# Patient Record
Sex: MALE | Race: WHITE
[De-identification: names, ages, dates, MRNs, and addresses within clinical notes are randomized per-mention and may not be internally consistent; named-entity substitution may affect disease eponyms.]

---

## 2016-12-20 VITALS
SYSTOLIC BLOOD PRESSURE: 161 MMHG | SYSTOLIC BLOOD PRESSURE: 161 MMHG | SYSTOLIC BLOOD PRESSURE: 161 MMHG | DIASTOLIC BLOOD PRESSURE: 88 MMHG | DIASTOLIC BLOOD PRESSURE: 88 MMHG | DIASTOLIC BLOOD PRESSURE: 88 MMHG

## 2020-01-07 ENCOUNTER — HOSPITAL ENCOUNTER (EMERGENCY)
Dept: HOSPITAL 61 - ER | Age: 65
Discharge: LEFT BEFORE DISCHARGE | End: 2020-01-07
Payer: COMMERCIAL

## 2020-01-07 DIAGNOSIS — T14.8XXA: Primary | ICD-10-CM
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
## 2020-01-07 DIAGNOSIS — Z53.21: ICD-10-CM
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
## 2020-02-10 ENCOUNTER — HOSPITAL ENCOUNTER (OUTPATIENT)
Dept: HOSPITAL 61 - SURGPAT | Age: 65
Discharge: HOME | End: 2020-02-10
Attending: ORTHOPAEDIC SURGERY
Payer: COMMERCIAL

## 2020-02-10 DIAGNOSIS — M17.12: ICD-10-CM

## 2020-02-10 DIAGNOSIS — Z88.8: ICD-10-CM

## 2020-02-10 DIAGNOSIS — Z01.812: Primary | ICD-10-CM

## 2020-02-10 LAB
ALBUMIN SERPL-MCNC: 3.8 G/DL (ref 3.4–5)
ANION GAP SERPL CALC-SCNC: 10 MMOL/L (ref 6–14)
APTT BLD: 30 SEC (ref 24–38)
BASOPHILS # BLD AUTO: 0 X10^3/UL (ref 0–0.2)
BASOPHILS NFR BLD: 1 % (ref 0–3)
BUN SERPL-MCNC: 15 MG/DL (ref 8–26)
CALCIUM SERPL-MCNC: 9.3 MG/DL (ref 8.5–10.1)
CHLORIDE SERPL-SCNC: 103 MMOL/L (ref 98–107)
CO2 SERPL-SCNC: 27 MMOL/L (ref 21–32)
CREAT SERPL-MCNC: 0.9 MG/DL (ref 0.7–1.3)
EOSINOPHIL NFR BLD: 0.2 X10^3/UL (ref 0–0.7)
EOSINOPHIL NFR BLD: 4 % (ref 0–3)
ERYTHROCYTE [DISTWIDTH] IN BLOOD BY AUTOMATED COUNT: 13.7 % (ref 11.5–14.5)
GFR SERPLBLD BASED ON 1.73 SQ M-ARVRAT: 85.2 ML/MIN
GLUCOSE SERPL-MCNC: 131 MG/DL (ref 70–99)
HCT VFR BLD CALC: 43.1 % (ref 39–53)
HGB BLD-MCNC: 14.4 G/DL (ref 13–17.5)
LYMPHOCYTES # BLD: 1.3 X10^3/UL (ref 1–4.8)
LYMPHOCYTES NFR BLD AUTO: 26 % (ref 24–48)
MCH RBC QN AUTO: 33 PG (ref 25–35)
MCHC RBC AUTO-ENTMCNC: 33 G/DL (ref 31–37)
MCV RBC AUTO: 98 FL (ref 79–100)
MONO #: 0.6 X10^3/UL (ref 0–1.1)
MONOCYTES NFR BLD: 12 % (ref 0–9)
NEUT #: 2.8 X10^3/UL (ref 1.8–7.7)
NEUTROPHILS NFR BLD AUTO: 57 % (ref 31–73)
PLATELET # BLD AUTO: 183 X10^3/UL (ref 140–400)
POTASSIUM SERPL-SCNC: 4.2 MMOL/L (ref 3.5–5.1)
PROTHROMBIN TIME: 12.6 SEC (ref 11.7–14)
RBC # BLD AUTO: 4.4 X10^6/UL (ref 4.3–5.7)
SODIUM SERPL-SCNC: 140 MMOL/L (ref 136–145)
WBC # BLD AUTO: 4.9 X10^3/UL (ref 4–11)

## 2020-02-10 PROCEDURE — 82040 ASSAY OF SERUM ALBUMIN: CPT

## 2020-02-10 PROCEDURE — 82306 VITAMIN D 25 HYDROXY: CPT

## 2020-02-10 PROCEDURE — 83036 HEMOGLOBIN GLYCOSYLATED A1C: CPT

## 2020-02-10 PROCEDURE — 85025 COMPLETE CBC W/AUTO DIFF WBC: CPT

## 2020-02-10 PROCEDURE — 36415 COLL VENOUS BLD VENIPUNCTURE: CPT

## 2020-02-10 PROCEDURE — 85651 RBC SED RATE NONAUTOMATED: CPT

## 2020-02-10 PROCEDURE — 85610 PROTHROMBIN TIME: CPT

## 2020-02-10 PROCEDURE — 85730 THROMBOPLASTIN TIME PARTIAL: CPT

## 2020-02-10 PROCEDURE — 80048 BASIC METABOLIC PNL TOTAL CA: CPT

## 2020-02-11 LAB — HBA1C MFR BLD: 6.2 % (ref 4.8–5.6)

## 2020-02-25 ENCOUNTER — HOSPITAL ENCOUNTER (OUTPATIENT)
Dept: HOSPITAL 61 - SURGPAT | Age: 65
Discharge: HOME | End: 2020-02-25
Attending: ORTHOPAEDIC SURGERY
Payer: COMMERCIAL

## 2020-02-25 DIAGNOSIS — J84.10: ICD-10-CM

## 2020-02-25 DIAGNOSIS — M17.12: ICD-10-CM

## 2020-02-25 DIAGNOSIS — Z01.818: Primary | ICD-10-CM

## 2020-02-25 PROCEDURE — 71046 X-RAY EXAM CHEST 2 VIEWS: CPT

## 2020-02-25 PROCEDURE — 87641 MR-STAPH DNA AMP PROBE: CPT

## 2020-02-25 PROCEDURE — 36415 COLL VENOUS BLD VENIPUNCTURE: CPT

## 2020-02-25 NOTE — RAD
EXAM: Chest, 2 views.

 

HISTORY: Pain.

 

COMPARISON: 12/13/2016

 

FINDINGS: 2 views of the chest are obtained. There is no infiltrate, 

pleural effusion or pneumothorax. The heart is normal in size. There is a 

stable granuloma overlying the left upper lobe.

 

IMPRESSION: No acute pulmonary finding.

 

Electronically signed by: Jayde Schultz MD (2/25/2020 4:32 PM) [id number]

## 2020-03-10 ENCOUNTER — HOSPITAL ENCOUNTER (OUTPATIENT)
Dept: HOSPITAL 61 - SURG | Age: 65
Setting detail: OBSERVATION
LOS: 1 days | Discharge: HOME | End: 2020-03-11
Attending: ORTHOPAEDIC SURGERY | Admitting: ORTHOPAEDIC SURGERY
Payer: COMMERCIAL

## 2020-03-10 VITALS — DIASTOLIC BLOOD PRESSURE: 48 MMHG | SYSTOLIC BLOOD PRESSURE: 140 MMHG

## 2020-03-10 VITALS — DIASTOLIC BLOOD PRESSURE: 69 MMHG | SYSTOLIC BLOOD PRESSURE: 129 MMHG

## 2020-03-10 VITALS — DIASTOLIC BLOOD PRESSURE: 69 MMHG | SYSTOLIC BLOOD PRESSURE: 131 MMHG

## 2020-03-10 VITALS — WEIGHT: 260 LBS | BODY MASS INDEX: 36.4 KG/M2 | HEIGHT: 71 IN

## 2020-03-10 VITALS — DIASTOLIC BLOOD PRESSURE: 52 MMHG | SYSTOLIC BLOOD PRESSURE: 117 MMHG

## 2020-03-10 VITALS — SYSTOLIC BLOOD PRESSURE: 139 MMHG | DIASTOLIC BLOOD PRESSURE: 69 MMHG

## 2020-03-10 VITALS — DIASTOLIC BLOOD PRESSURE: 43 MMHG | SYSTOLIC BLOOD PRESSURE: 141 MMHG

## 2020-03-10 VITALS — SYSTOLIC BLOOD PRESSURE: 101 MMHG | DIASTOLIC BLOOD PRESSURE: 65 MMHG

## 2020-03-10 VITALS — DIASTOLIC BLOOD PRESSURE: 67 MMHG | SYSTOLIC BLOOD PRESSURE: 127 MMHG

## 2020-03-10 DIAGNOSIS — F32.9: ICD-10-CM

## 2020-03-10 DIAGNOSIS — M96.840: Primary | ICD-10-CM

## 2020-03-10 DIAGNOSIS — I10: ICD-10-CM

## 2020-03-10 DIAGNOSIS — L03.116: ICD-10-CM

## 2020-03-10 DIAGNOSIS — E78.5: ICD-10-CM

## 2020-03-10 LAB
ANION GAP SERPL CALC-SCNC: 10 MMOL/L (ref 6–14)
BUN SERPL-MCNC: 16 MG/DL (ref 8–26)
CALCIUM SERPL-MCNC: 9 MG/DL (ref 8.5–10.1)
CHLORIDE SERPL-SCNC: 103 MMOL/L (ref 98–107)
CO2 SERPL-SCNC: 24 MMOL/L (ref 21–32)
CREAT SERPL-MCNC: 0.8 MG/DL (ref 0.7–1.3)
CRP SERPL-MCNC: 52 MG/L (ref 0–3.3)
GFR SERPLBLD BASED ON 1.73 SQ M-ARVRAT: 97.6 ML/MIN
GLUCOSE SERPL-MCNC: 114 MG/DL (ref 70–99)
POTASSIUM SERPL-SCNC: 4.1 MMOL/L (ref 3.5–5.1)
SODIUM SERPL-SCNC: 137 MMOL/L (ref 136–145)

## 2020-03-10 PROCEDURE — A4461 SURGICL DRESS HOLD NON-REUSE: HCPCS

## 2020-03-10 PROCEDURE — 96368 THER/DIAG CONCURRENT INF: CPT

## 2020-03-10 PROCEDURE — 87071 CULTURE AEROBIC QUANT OTHER: CPT

## 2020-03-10 PROCEDURE — C1769 GUIDE WIRE: HCPCS

## 2020-03-10 PROCEDURE — 96366 THER/PROPH/DIAG IV INF ADDON: CPT

## 2020-03-10 PROCEDURE — 80048 BASIC METABOLIC PNL TOTAL CA: CPT

## 2020-03-10 PROCEDURE — 87641 MR-STAPH DNA AMP PROBE: CPT

## 2020-03-10 PROCEDURE — 97165 OT EVAL LOW COMPLEX 30 MIN: CPT

## 2020-03-10 PROCEDURE — 87102 FUNGUS ISOLATION CULTURE: CPT

## 2020-03-10 PROCEDURE — 96365 THER/PROPH/DIAG IV INF INIT: CPT

## 2020-03-10 PROCEDURE — 97116 GAIT TRAINING THERAPY: CPT

## 2020-03-10 PROCEDURE — 82962 GLUCOSE BLOOD TEST: CPT

## 2020-03-10 PROCEDURE — 36415 COLL VENOUS BLD VENIPUNCTURE: CPT

## 2020-03-10 PROCEDURE — G0378 HOSPITAL OBSERVATION PER HR: HCPCS

## 2020-03-10 PROCEDURE — 86140 C-REACTIVE PROTEIN: CPT

## 2020-03-10 PROCEDURE — 96375 TX/PRO/DX INJ NEW DRUG ADDON: CPT

## 2020-03-10 PROCEDURE — 87075 CULTR BACTERIA EXCEPT BLOOD: CPT

## 2020-03-10 PROCEDURE — 85025 COMPLETE CBC W/AUTO DIFF WBC: CPT

## 2020-03-10 PROCEDURE — 85007 BL SMEAR W/DIFF WBC COUNT: CPT

## 2020-03-10 PROCEDURE — 96367 TX/PROPH/DG ADDL SEQ IV INF: CPT

## 2020-03-10 PROCEDURE — G0379 DIRECT REFER HOSPITAL OBSERV: HCPCS

## 2020-03-10 PROCEDURE — 97162 PT EVAL MOD COMPLEX 30 MIN: CPT

## 2020-03-10 PROCEDURE — 87116 MYCOBACTERIA CULTURE: CPT

## 2020-03-10 PROCEDURE — 85651 RBC SED RATE NONAUTOMATED: CPT

## 2020-03-10 PROCEDURE — 97110 THERAPEUTIC EXERCISES: CPT

## 2020-03-10 PROCEDURE — 90471 IMMUNIZATION ADMIN: CPT

## 2020-03-10 PROCEDURE — 27301 DRAIN THIGH/KNEE LESION: CPT

## 2020-03-10 PROCEDURE — 90686 IIV4 VACC NO PRSV 0.5 ML IM: CPT

## 2020-03-10 RX ADMIN — ONDANSETRON SCH MG: 2 INJECTION INTRAMUSCULAR; INTRAVENOUS at 18:00

## 2020-03-10 RX ADMIN — ONDANSETRON SCH MG: 4 TABLET, ORALLY DISINTEGRATING ORAL at 17:09

## 2020-03-10 RX ADMIN — OXYCODONE HYDROCHLORIDE AND ACETAMINOPHEN PRN TAB: 5; 325 TABLET ORAL at 21:12

## 2020-03-10 RX ADMIN — INSULIN LISPRO SCH UNITS: 100 INJECTION, SOLUTION INTRAVENOUS; SUBCUTANEOUS at 16:28

## 2020-03-10 RX ADMIN — OXYCODONE HYDROCHLORIDE AND ACETAMINOPHEN PRN TAB: 5; 325 TABLET ORAL at 17:09

## 2020-03-10 RX ADMIN — METOPROLOL TARTRATE SCH MG: 50 TABLET, FILM COATED ORAL at 20:37

## 2020-03-10 RX ADMIN — FENTANYL CITRATE PRN MCG: 50 INJECTION INTRAMUSCULAR; INTRAVENOUS at 15:39

## 2020-03-10 RX ADMIN — BACITRACIN SCH MLS/HR: 5000 INJECTION, POWDER, FOR SOLUTION INTRAMUSCULAR at 17:10

## 2020-03-10 RX ADMIN — FENTANYL CITRATE PRN MCG: 50 INJECTION INTRAMUSCULAR; INTRAVENOUS at 15:30

## 2020-03-10 NOTE — PDOC4
Operative Note


Operative Note


Date of Procedure: March 10, 2020


Pre-Op Diagnosis:  Left knee cellulitis and hematoma L03.116


Post-Op Diagnosis: Left knee cellulitis and hematoma L03.116


Procedure:  irrigation and debridement left knee deep hematoma CPT 92346


Surgeon: Mira Breen MD


Assistant: PAULA Jorge


Anesthesia:  General


EBL: 50 mL


Specimens Obtained:  


1.  superficial (bursa)  fluid for aerobic, anaerobic, fungal and AFB cultures, 

sent in a specimen cup, there is a possibility that this culture is contaminated

by skin mo because the fluid ran over the skin as it flowed into the specimen

cup


2.  deep (synovial) fluid for aerobic, anaerobic, fungal, and AFB cultures sent 

in a specimen cup


Complications:  none


Drains: none


Findings: deep heavily clotted/coagulated hematoma, and a superficial hemat

gil/bursal fluid collection which was more serous and not clotted.  No apparent 

infection.





Indications for Procedure: Mr. Cesar had left total knee arthroplasty 7 days 

ago.  At the time of knee replacement he had more than an average amount of the 

typical synovitis seen in osteoarthritic knees.  Much of that synovium was 

excised at the time of the surgery, using electrocautery.  He has been on 

aspirin for DVT prophylaxis.  He was more active than I would have liked over 

the weekend, and apparently had a plumbing leak at home that caused him to work 

up on a ladder cutting out part of the ceiling and doing a plumbing repair.  I 

saw him in the office yesterday, and he has serous fluid still draining from the

incision, has saturated several of his dressings, and has extensive ecchymosis 

of the leg and thigh.  I attempted aspiration in the office using a 16-gauge 

needle, but was unable to retrieve any joint fluid, presumably because there is 

a clotted hematoma.  I recommended incision and drainage of hematoma, and we 

will hold off on aggressive therapy, decrease his activity, and possibly stop 

the use of aspirin.  He agrees with surgical incision and drainage.  My index of

suspicion for any infection is very low, and I think we should give him 

preoperative antibiotics before the incision and drainage of the hematoma, but I

will take intraoperative cultures.  All of his questions about surgery were 

answered and he desires to proceed.





Procedure in Detail: The patient was identified in the preoperative holding are

a.  The correct left lower extremity was marked by me.  The patient was taken to

the operating room where general anesthetic was used.  The patient was 

positioned supine on the operating table. 


A tourniquet was applied to the upper portion of the limb.   The tourniquet was 

never inflated but was in preparation if needed.  Preoperative antibiotics were 

given intravenously, and due to a prior positive MRSA culture, he was given both

vancomycin and cephalexin.  A timeout procedure was performed.





The limb was prepared in sterile fashion with Betadine scrub and then Betadine 

paint. Sterile drapes were applied. An impervious stockinette was used over the 

lower limb and secured with a Coban. The operating team wore the personal 

exhaust ventilated hoods.





The tourniquet had been applied but I did not inflate it, so that I could assess

for areas of bleeding, and plans to use the tourniquet only if needed for 

excessive bleeding.  The previous incision was reopened sharply, and sutures 

were removed. The subcutaneous layer has copious amounts of bursal fluid, not 

clotted, and as this fluid ran over the skin I sent this for cultures.  There is

prominent bursal tissue (and he likely has mild chronic prepatellar bursitis.  I

had not excised the bursa previously but I did excise some of the bursa now.  

There was still quite a bit of subcutaneous tissue oozing, no major vessels, but

difficult to achieve hemostasis even with persistent electrocautery.  





The deep capsular sutures were now incised, and the deep clotted hematoma was 

encountered.  I used a rongeurs to grasp the clotted hematoma and sent this for 

the deep cultures.  There is no evidence of infection. There was a large amount 

of deep clotted hematoma, and persistent bleeding from the remaining synovium.  

Electrocautery was used, thoroughly, and despite this it was difficult to 

achieve hemostasis.





The Swengel interpulse  was used. I first used a liter of Bactisure 

irrigation solution, and continued to irrigate and flush the knee of residual 

hematoma, including the suprapatellar pouch, the medial and lateral gutters, and

the intercondylar notch. 





Next copious saline irrigation was used.  Betadine lavage was used next.  Final 

copious saline irrigation was used.  I used a 3 L bag of sterile saline and 

irrigated that throughout the joint and tissues.  There were small bleeders in 

the proximal capsular quadriceps region and  Bovie electrocautery was used for 

hemostasis.  Hemostasis was achieved, but was difficult due to persistent oozing

from almost all the surfaces of the tissue.  





I cleansed the skin again with a Betadine-soaked laparotomy sponge, and I 

debrided the unroofed and loose skin from the blisters using the laparotomy 

sponge.  The skin was then cleansed with a saline soaked laparotomy sponges and 

dried.  Outer gloves were changed. 





The incision was closed carefully in layers. #1 PDS was used in a 

figure-of-eight fashion throughout the medial capsular incision. #1 StrataFix 

was used in a running fashion and the medial capsule. #2-0 PDS was used in the s

ubcutaneous tissues using inverted interrupted pattern. The skin was 

reapproximated with staples. Xeroform was applied.  Needle and sponge counts 

were correct. There were no apparent complications.











MIRA BREEN MD                 Mar 10, 2020 14:17

## 2020-03-10 NOTE — PDOC
Provider Note


Provider Note


Due to the hematoma, and persistent bleeding at surgery, I am going to hold his 

aspirin.  For DVT prophylaxis we can use mechanical devices, and early 

mobilization.











MIRA CASTAÑEDA MD                 Mar 10, 2020 14:46

## 2020-03-10 NOTE — PDOC1
History and Physical


Date of Admission


Date of Admission


DATE: 3/10/20 


TIME: 14:18





Identification/Chief Complaint


Chief Complaint


Left knee hematoma





Source


Source:  Chart review, Patient





History of Present Illness


History of Present Illness


Mr. Cheng is a 64-year-old man who had left knee arthroplasty 7 days ago.  I 

saw him in the office yesterday with persistent bloody drainage.  I attempted 

aspiration but no fluid could be obtained.  He has extensive ecchymosis, some 

serous drainage from the skin, and probable hemarthrosis which is clotted on 

examination.  He is here for irrigation of the total knee arthroplasty hematoma.





Past Medical History


Cardiovascular:  HTN, Hyperlipidemia


Pulmonary:  Other


Psych:  Depression





Past Surgical History


Past Surgical History:  Other





Family History


Family History:  No Significant





Social History


ALCOHOL:  heavy


Drugs:  None





Current Medications


Current Medications





Current Medications


Fentanyl Citrate (Fentanyl 2ml Vial) 25 mcg PRN Q5MIN  PRN IV MILD PAIN 1-3;  

Start 3/10/20 at 07:00;  Stop 3/11/20 at 06:59


Fentanyl Citrate (Fentanyl 2ml Vial) 50 mcg PRN Q5MIN  PRN IV MODERATE TO SEVERE

PAIN;  Start 3/10/20 at 07:00;  Stop 3/11/20 at 06:59


Morphine Sulfate (Morphine Sulfate) 1 mg PRN Q10MIN  PRN IV SEVERE PAIN 7-10;  

Start 3/10/20 at 07:00;  Stop 3/11/20 at 06:59


Ringer's Solution 1,000 ml @  30 mls/hr Q24H IV ;  Start 3/10/20 at 07:00;  Stop

3/10/20 at 18:59


Lidocaine HCl (Xylocaine-Mpf 1% 2ml Vial) 2 ml PRN 1X  PRN ID PRIOR TO IV START;

 Start 3/10/20 at 07:00;  Stop 3/11/20 at 06:59


Hydromorphone HCl (Dilaudid) 0.5 mg PRN Q10MIN  PRN IV SEV PAIN, Second choice; 

Start 3/10/20 at 07:00;  Stop 3/11/20 at 06:59


Prochlorperazine Edisylate (Compazine) 5 mg PACU PRN  PRN IV NAUSEA, MRX1;  

Start 3/10/20 at 07:00;  Stop 3/11/20 at 06:59


Acetaminophen (Tylenol) 1,000 mg 1X PREOP  PRN PO PRIOR TO PROCEDURE Last 

administered on 3/10/20at 12:29;  Start 3/10/20 at 06:00;  Stop 3/10/20 at 18:00


Cefazolin Sodium 3 gm/Dextrose 100 ml @  200 mls/hr 1X PREOP  PRN IV PRIOR TO 

PROCEDURE;  Start 3/10/20 at 06:00;  Stop 3/10/20 at 18:00


Vancomycin HCl 250 ml @  250 mls/hr 1X PREOP  PRN IV PRIOR TO PROCEDURE Last 

administered on 3/10/20at 12:12;  Start 3/10/20 at 06:00;  Stop 3/10/20 at 18:00


Tranexamic Acid 1000 mg/Sodium Chloride 60 ml @ 60 mls/hr 1X PERIOP  ONCE INJ ; 

Start 3/10/20 at 06:00;  Stop 3/10/20 at 06:59;  Status DC


Tranexamic Acid 1000 mg/Sodium Chloride 60 ml @ 60 mls/hr 1X PERIOP  ONCE INJ ; 

Start 3/10/20 at 08:00;  Stop 3/10/20 at 08:59;  Status DC


Celecoxib (CeleBREX) 400 mg ONCE  ONCE PO  Last administered on 3/10/20at 12:29;

 Start 3/10/20 at 06:00;  Stop 3/10/20 at 06:01;  Status DC


Vancomycin HCl (Vancomycin) 1 gm STK-MED ONCE .ROUTE  Last administered on 

3/10/20at 13:25;  Start 3/10/20 at 11:54;  Stop 3/10/20 at 11:54;  Status DC


Tobramycin Sulfate (Tobramycin Powder) 1.2 gm STK-MED ONCE .ROUTE ;  Start 

3/10/20 at 11:54;  Stop 3/10/20 at 11:54;  Status DC


Vancomycin HCl (Vancomycin) 1 gm STK-MED ONCE .ROUTE ;  Start 3/10/20 at 11:54; 

Stop 3/10/20 at 11:54;  Status DC


Morphine Sulfate 5 mg/Ketorolac Tromethamine 30 mg/Ropivacaine 60 ml/Epinephrine

HCl 0.5 mg/Sodium Chloride 100 ml @  100 mls/hr 1X  ONCE INT ART ;  Start 

3/10/20 at 12:15;  Stop 3/10/20 at 13:14;  Status DC


Celecoxib (CeleBREX) 100 mg STK-MED ONCE .ROUTE ;  Start 3/10/20 at 12:24;  Stop

3/10/20 at 12:24;  Status DC


Insulin Human Lispro (HumaLOG VIAL for OP,RR ONLY) 0-10 units PRN Q1HR  PRN SQ 

PER PROTOCOL Last administered on 3/10/20at 12:38;  Start 3/10/20 at 12:30;  

Stop 3/11/20 at 12:29


Propofol 20 ml @ As Directed STK-MED ONCE IV ;  Start 3/10/20 at 12:34;  Stop 

3/10/20 at 12:34;  Status DC


Dexamethasone Sodium Phosphate (Decadron) 4 mg STK-MED ONCE .ROUTE ;  Start 

3/10/20 at 12:34;  Stop 3/10/20 at 12:34;  Status DC


Famotidine (Pepcid Vial) 20 mg STK-MED ONCE .ROUTE ;  Start 3/10/20 at 12:34;  

Stop 3/10/20 at 12:34;  Status DC


Lidocaine HCl (Lidocaine Pf 2% Vial) 5 ml STK-MED ONCE .ROUTE ;  Start 3/10/20 

at 12:34;  Stop 3/10/20 at 12:34;  Status DC


Ondansetron HCl (Zofran) 4 mg STK-MED ONCE .ROUTE ;  Start 3/10/20 at 12:34;  

Stop 3/10/20 at 12:34;  Status DC


Fentanyl Citrate (Fentanyl 2ml Vial) 100 mcg STK-MED ONCE .ROUTE ;  Start 

3/10/20 at 12:34;  Stop 3/10/20 at 12:34;  Status DC


Midazolam HCl (Versed) 2 mg STK-MED ONCE .ROUTE ;  Start 3/10/20 at 12:34;  Stop

3/10/20 at 12:34;  Status DC


Ephedrine Sulfate (ePHEDrine PF IN SALINE SYRINGE) 50 mg STK-MED ONCE IV ;  

Start 3/10/20 at 12:55;  Stop 3/10/20 at 12:55;  Status DC


Sevoflurane (Ultane) 60 ml STK-MED ONCE IH ;  Start 3/10/20 at 14:04;  Stop 

3/10/20 at 14:05;  Status DC





Active Scripts


Active


Ondansetron Odt (Ondansetron) 4 Mg Tab.rapdis 4 Mg PO PRN Q6HRS PRN 8 Days


     Take one tablet by mouth every 6 hrs prn nausea.


Percocet 5-325 Mg Tablet ** (Oxycodone/Acetaminophen) 1 Each Tablet 2 Tab PO PRN

Q4HRS PRN MDD 8 tablets daily 14 Days


Reported


Amlodipine Besylate 10 Mg Tablet 10 Mg PO DAILY


Atorvastatin Calcium 40 Mg Tablet 1 Tab PO DAILY


Metformin HCl 500 Mg/5 Ml Solution 500 Mg PO BIDAC


Metoprolol Tartrate 50 Mg Tablet 1 Tab PO BID


Escitalopram Oxalate 20 Mg Tablet 1 Tab PO DAILY


Zetia (Ezetimibe) 10 Mg Tablet 1 Tab PO DAILY 30 Days


Lisinopril 40 Mg Tablet 1 Tab PO DAILY


Celebrex (Celecoxib) 200 Mg Capsule 1 Cap PO DAILY





Allergies


Allergies:  


Coded Allergies:  


     atorvastatin (Verified  Allergy, Intermediate, 3/10/20)


     venlafaxine (Verified  Allergy, Intermediate, Unknown, 3/10/20)


     I S O L A T I O N *CONTACT* (Verified  Allergy, Unknown, 3/10/20)


   


   mrsa


     bupropion (Verified  Adverse Reaction, Intermediate, 3/10/20)


   


   CAUSED INCREASED PAIN


     sertraline (Verified  Adverse Reaction, Intermediate, Unknown, 3/10/20)





Physical Exam


General:  Alert


HEENT:  Atraumatic


Lungs:  Normal air movement


Heart:  RRR


Abdomen:  Soft


Extremities:  Other (There is serous drainage from the incision.  There is 

extensive ecchymosis of the leg and some cellulitis.  There is no purulent 

drainage, no warmth, no systemic fevers, no obvious infection.  There is a 

hemarthrosis on examination.)


Skin:  Other (He had still has some persistent psoriatic lesions.  There are 

some blisters which have previously ruptured around the knee.)


Neuro:  Normal speech, Sensation intact





Vitals


Vitals





Vital Signs








  Date Time  Temp Pulse Resp B/P (MAP) Pulse Ox O2 Delivery O2 Flow Rate FiO2


 


3/10/20 11:57 97.4 57 20 151/69 95 Room Air  





 97.4       











Labs


Labs





Laboratory Tests








Test


 3/10/20


12:00 3/10/20


12:15


 


Erythrocyte Sedimentation Rate 60 (0-15)  


 


Sodium Level


 137 mmol/L


(136-145) 





 


Potassium Level


 4.1 mmol/L


(3.5-5.1) 





 


Chloride Level


 103 mmol/L


() 





 


Carbon Dioxide Level


 24 mmol/L


(21-32) 





 


Anion Gap 10 (6-14)  


 


Blood Urea Nitrogen


 16 mg/dL


(8-26) 





 


Creatinine


 0.8 mg/dL


(0.7-1.3) 





 


Estimated GFR


(Cockcroft-Gault) 97.6 


 





 


Glucose Level


 114 mg/dL


(70-99) 





 


Calcium Level


 9.0 mg/dL


(8.5-10.1) 





 


C-Reactive Protein,


Quantitative 52.0 mg/L


(0-3.3) 





 


Glucose (Fingerstick)


 


 119 mg/dL


(70-99)








Laboratory Tests








Test


 3/10/20


12:00 3/10/20


12:15


 


Erythrocyte Sedimentation Rate 60 (0-15)  


 


Sodium Level


 137 mmol/L


(136-145) 





 


Potassium Level


 4.1 mmol/L


(3.5-5.1) 





 


Chloride Level


 103 mmol/L


() 





 


Carbon Dioxide Level


 24 mmol/L


(21-32) 





 


Anion Gap 10 (6-14)  


 


Blood Urea Nitrogen


 16 mg/dL


(8-26) 





 


Creatinine


 0.8 mg/dL


(0.7-1.3) 





 


Estimated GFR


(Cockcroft-Gault) 97.6 


 





 


Glucose Level


 114 mg/dL


(70-99) 





 


Calcium Level


 9.0 mg/dL


(8.5-10.1) 





 


C-Reactive Protein,


Quantitative 52.0 mg/L


(0-3.3) 





 


Glucose (Fingerstick)


 


 119 mg/dL


(70-99)











VTE Prophylaxis Ordered


VTE Prophylaxis Devices:  Yes


VTE Pharmacological Prophylaxi:  Contraindicated





Assessment/Plan


Assessment/Plan


He is here for irrigation and debridement of the left knee hemarthrosis and 

hematoma.











MIRA CASTAÑEDA MD                 Mar 10, 2020 14:20

## 2020-03-11 VITALS — SYSTOLIC BLOOD PRESSURE: 127 MMHG | DIASTOLIC BLOOD PRESSURE: 63 MMHG

## 2020-03-11 VITALS
DIASTOLIC BLOOD PRESSURE: 55 MMHG | DIASTOLIC BLOOD PRESSURE: 55 MMHG | SYSTOLIC BLOOD PRESSURE: 111 MMHG | SYSTOLIC BLOOD PRESSURE: 111 MMHG | SYSTOLIC BLOOD PRESSURE: 111 MMHG | DIASTOLIC BLOOD PRESSURE: 55 MMHG | SYSTOLIC BLOOD PRESSURE: 111 MMHG | DIASTOLIC BLOOD PRESSURE: 55 MMHG

## 2020-03-11 VITALS — SYSTOLIC BLOOD PRESSURE: 119 MMHG | DIASTOLIC BLOOD PRESSURE: 64 MMHG

## 2020-03-11 VITALS — SYSTOLIC BLOOD PRESSURE: 105 MMHG | DIASTOLIC BLOOD PRESSURE: 54 MMHG

## 2020-03-11 LAB
% BANDS: 2 % (ref 0–9)
% LYMPHS: 9 % (ref 24–48)
% MONOS: 2 % (ref 0–10)
% SEGS: 87 % (ref 35–66)
ANISOCYTOSIS BLD QL SMEAR: SLIGHT
BASOPHILS # BLD AUTO: 0 X10^3/UL (ref 0–0.2)
BASOPHILS NFR BLD: 0 % (ref 0–3)
EOSINOPHIL NFR BLD: 0 % (ref 0–3)
EOSINOPHIL NFR BLD: 0 X10^3/UL (ref 0–0.7)
ERYTHROCYTE [DISTWIDTH] IN BLOOD BY AUTOMATED COUNT: 13.4 % (ref 11.5–14.5)
HCT VFR BLD CALC: 30.2 % (ref 39–53)
HGB BLD-MCNC: 10.4 G/DL (ref 13–17.5)
LYMPHOCYTES # BLD: 0.5 X10^3/UL (ref 1–4.8)
LYMPHOCYTES NFR BLD AUTO: 8 % (ref 24–48)
MCH RBC QN AUTO: 34 PG (ref 25–35)
MCHC RBC AUTO-ENTMCNC: 34 G/DL (ref 31–37)
MCV RBC AUTO: 98 FL (ref 79–100)
MONO #: 0.4 X10^3/UL (ref 0–1.1)
MONOCYTES NFR BLD: 5 % (ref 0–9)
NEUT #: 5.9 X10^3/UL (ref 1.8–7.7)
NEUTROPHILS NFR BLD AUTO: 87 % (ref 31–73)
PLATELET # BLD AUTO: 232 X10^3/UL (ref 140–400)
PLATELET # BLD EST: ADEQUATE 10*3/UL
POLYCHROMASIA BLD QL SMEAR: SLIGHT
RBC # BLD AUTO: 3.07 X10^6/UL (ref 4.3–5.7)
WBC # BLD AUTO: 6.8 X10^3/UL (ref 4–11)

## 2020-03-11 RX ADMIN — OXYCODONE HYDROCHLORIDE AND ACETAMINOPHEN PRN TAB: 5; 325 TABLET ORAL at 09:58

## 2020-03-11 RX ADMIN — ONDANSETRON SCH MG: 4 TABLET, ORALLY DISINTEGRATING ORAL at 00:00

## 2020-03-11 RX ADMIN — ONDANSETRON SCH MG: 2 INJECTION INTRAMUSCULAR; INTRAVENOUS at 00:00

## 2020-03-11 RX ADMIN — ONDANSETRON SCH MG: 4 TABLET, ORALLY DISINTEGRATING ORAL at 06:00

## 2020-03-11 RX ADMIN — INSULIN LISPRO SCH UNITS: 100 INJECTION, SOLUTION INTRAVENOUS; SUBCUTANEOUS at 11:51

## 2020-03-11 RX ADMIN — ONDANSETRON SCH MG: 2 INJECTION INTRAMUSCULAR; INTRAVENOUS at 06:00

## 2020-03-11 RX ADMIN — OXYCODONE HYDROCHLORIDE AND ACETAMINOPHEN PRN TAB: 5; 325 TABLET ORAL at 14:20

## 2020-03-11 RX ADMIN — INSULIN LISPRO SCH UNITS: 100 INJECTION, SOLUTION INTRAVENOUS; SUBCUTANEOUS at 07:46

## 2020-03-11 RX ADMIN — ONDANSETRON SCH MG: 4 TABLET, ORALLY DISINTEGRATING ORAL at 11:51

## 2020-03-11 RX ADMIN — BACITRACIN SCH MLS/HR: 5000 INJECTION, POWDER, FOR SOLUTION INTRAMUSCULAR at 14:40

## 2020-03-11 RX ADMIN — ONDANSETRON SCH MG: 2 INJECTION INTRAMUSCULAR; INTRAVENOUS at 11:51

## 2020-03-11 RX ADMIN — OXYCODONE HYDROCHLORIDE AND ACETAMINOPHEN PRN TAB: 5; 325 TABLET ORAL at 06:05

## 2020-03-11 RX ADMIN — METOPROLOL TARTRATE SCH MG: 50 TABLET, FILM COATED ORAL at 08:13

## 2020-03-11 RX ADMIN — OXYCODONE HYDROCHLORIDE AND ACETAMINOPHEN PRN TAB: 5; 325 TABLET ORAL at 01:46

## 2020-03-11 NOTE — NUR
SW following. Discussed with RN, pt from home with wife. Currently on IV abx. RN advised no 
SW needs at this time. ALIREZA will continue to follow. 

-------------------------------------------------------------------------------

Addendum: 03/11/20 at 1534 by NIKUNJ LAY

-------------------------------------------------------------------------------

OT recommending home with outpatient therapy. Pt will need a script for outpatient therapy 
to take to the outpatient therapy office of his choosing. RN awaiting Dr. Breen to round. ALIREZA 
will continue to follow.

## 2020-03-11 NOTE — PDOC
PROGRESS NOTES


Subjective


Subjective


Doing better today





Objective


Vital Signs





Vital Signs








  Date Time  Temp Pulse Resp B/P (MAP) Pulse Ox O2 Delivery O2 Flow Rate FiO2


 


3/11/20 15:59      Room Air  


 


3/11/20 15:00 98.4 60 18 111/55 (73) 95   





 98.4       


 


3/10/20 17:09       2.0 








Physical Exam


Incision dry now, dressing changed earlier today. Knee looks and feels better.


Labs


Micro doesn't even have preliminary results yet.  Two specimens sent yesterday. 

I will follow these as outpatient.








Laboratory Tests








Test


 3/10/20


12:00 3/10/20


12:15 3/10/20


14:37 3/10/20


16:19


 


Erythrocyte Sedimentation Rate 60 (0-15)    


 


Sodium Level


 137 mmol/L


(136-145) 


 


 





 


Potassium Level


 4.1 mmol/L


(3.5-5.1) 


 


 





 


Chloride Level


 103 mmol/L


() 


 


 





 


Carbon Dioxide Level


 24 mmol/L


(21-32) 


 


 





 


Anion Gap 10 (6-14)    


 


Blood Urea Nitrogen


 16 mg/dL


(8-26) 


 


 





 


Creatinine


 0.8 mg/dL


(0.7-1.3) 


 


 





 


Estimated GFR


(Cockcroft-Gault) 97.6 


 


 


 





 


Glucose Level


 114 mg/dL


(70-99) 


 


 





 


Calcium Level


 9.0 mg/dL


(8.5-10.1) 


 


 





 


C-Reactive Protein,


Quantitative 52.0 mg/L


(0-3.3) 


 


 





 


Glucose (Fingerstick)


 


 119 mg/dL


(70-99) 127 mg/dL


(70-99) 126 mg/dL


(70-99)


 


Test


 3/10/20


20:42 3/11/20


03:35 3/11/20


07:32 3/11/20


11:44


 


Glucose (Fingerstick)


 194 mg/dL


(70-99) 


 139 mg/dL


(70-99) 148 mg/dL


(70-99)


 


White Blood Count


 


 6.8 x10^3/uL


(4.0-11.0) 


 





 


Red Blood Count


 


 3.07 x10^6/uL


(4.30-5.70) 


 





 


Hemoglobin


 


 10.4 g/dL


(13.0-17.5) 


 





 


Hematocrit


 


 30.2 %


(39.0-53.0) 


 





 


Mean Corpuscular Volume  98 fL ()   


 


Mean Corpuscular Hemoglobin  34 pg (25-35)   


 


Mean Corpuscular Hemoglobin


Concent 


 34 g/dL


(31-37) 


 





 


Red Cell Distribution Width


 


 13.4 %


(11.5-14.5) 


 





 


Platelet Count


 


 232 x10^3/uL


(140-400) 


 





 


Neutrophils (%) (Auto)  87 % (31-73)   


 


Lymphocytes (%) (Auto)  8 % (24-48)   


 


Monocytes (%) (Auto)  5 % (0-9)   


 


Eosinophils (%) (Auto)  0 % (0-3)   


 


Basophils (%) (Auto)  0 % (0-3)   


 


Neutrophils # (Auto)


 


 5.9 x10^3/uL


(1.8-7.7) 


 





 


Lymphocytes # (Auto)


 


 0.5 x10^3/uL


(1.0-4.8) 


 





 


Monocytes # (Auto)


 


 0.4 x10^3/uL


(0.0-1.1) 


 





 


Eosinophils # (Auto)


 


 0.0 x10^3/uL


(0.0-0.7) 


 





 


Basophils # (Auto)


 


 0.0 x10^3/uL


(0.0-0.2) 


 





 


Segmented Neutrophils %  87 % (35-66)   


 


Band Neutrophils %  2 % (0-9)   


 


Lymphocytes %  9 % (24-48)   


 


Monocytes %  2 % (0-10)   


 


Platelet Estimate


 


 Adequate


(ADEQUATE) 


 





 


Polychromasia  Slight   


 


Anisocytosis  Slight   








Laboratory Tests








Test


 3/10/20


20:42 3/11/20


03:35 3/11/20


07:32 3/11/20


11:44


 


Glucose (Fingerstick)


 194 mg/dL


(70-99) 


 139 mg/dL


(70-99) 148 mg/dL


(70-99)


 


White Blood Count


 


 6.8 x10^3/uL


(4.0-11.0) 


 





 


Red Blood Count


 


 3.07 x10^6/uL


(4.30-5.70) 


 





 


Hemoglobin


 


 10.4 g/dL


(13.0-17.5) 


 





 


Hematocrit


 


 30.2 %


(39.0-53.0) 


 





 


Mean Corpuscular Volume  98 fL ()   


 


Mean Corpuscular Hemoglobin  34 pg (25-35)   


 


Mean Corpuscular Hemoglobin


Concent 


 34 g/dL


(31-37) 


 





 


Red Cell Distribution Width


 


 13.4 %


(11.5-14.5) 


 





 


Platelet Count


 


 232 x10^3/uL


(140-400) 


 





 


Neutrophils (%) (Auto)  87 % (31-73)   


 


Lymphocytes (%) (Auto)  8 % (24-48)   


 


Monocytes (%) (Auto)  5 % (0-9)   


 


Eosinophils (%) (Auto)  0 % (0-3)   


 


Basophils (%) (Auto)  0 % (0-3)   


 


Neutrophils # (Auto)


 


 5.9 x10^3/uL


(1.8-7.7) 


 





 


Lymphocytes # (Auto)


 


 0.5 x10^3/uL


(1.0-4.8) 


 





 


Monocytes # (Auto)


 


 0.4 x10^3/uL


(0.0-1.1) 


 





 


Eosinophils # (Auto)


 


 0.0 x10^3/uL


(0.0-0.7) 


 





 


Basophils # (Auto)


 


 0.0 x10^3/uL


(0.0-0.2) 


 





 


Segmented Neutrophils %  87 % (35-66)   


 


Band Neutrophils %  2 % (0-9)   


 


Lymphocytes %  9 % (24-48)   


 


Monocytes %  2 % (0-10)   


 


Platelet Estimate


 


 Adequate


(ADEQUATE) 


 





 


Polychromasia  Slight   


 


Anisocytosis  Slight   











Assessment


Assessment


POD#1 knee hematoma I&D





Plan


Plan of Care


Home today. Office FU 3/23











MIRA CASTAÑEDA MD                 Mar 11, 2020 16:57

## 2020-03-11 NOTE — NUR
Pt. discharged to home verbalized understanding of discharge instructions. L knee dressing 
CDI. Keflex sent to pharmacy per Dr. santana.

## 2020-03-11 NOTE — PDOC
ORTHO PROGRESS NOTES


Subjective


Patient is feeling well today, pain controlled, swelling down in the knee.


Post-op Day:  1


Procedure


 irrigation and debridement left knee deep hematoma CPT 02320


Vitals





Vital Signs








  Date Time  Temp Pulse Resp B/P (MAP) Pulse Ox O2 Delivery O2 Flow Rate FiO2


 


3/11/20 11:09      Room Air  


 


3/11/20 11:00 98.7 65 18 119/64 (82) 93   





 98.7       


 


3/10/20 17:09       2.0 








Labs





Laboratory Tests








Test


 3/10/20


12:00 3/10/20


12:15 3/10/20


14:37 3/10/20


16:19


 


Erythrocyte Sedimentation Rate 60 (0-15)    


 


Sodium Level


 137 mmol/L


(136-145) 


 


 





 


Potassium Level


 4.1 mmol/L


(3.5-5.1) 


 


 





 


Chloride Level


 103 mmol/L


() 


 


 





 


Carbon Dioxide Level


 24 mmol/L


(21-32) 


 


 





 


Anion Gap 10 (6-14)    


 


Blood Urea Nitrogen


 16 mg/dL


(8-26) 


 


 





 


Creatinine


 0.8 mg/dL


(0.7-1.3) 


 


 





 


Estimated GFR


(Cockcroft-Gault) 97.6 


 


 


 





 


Glucose Level


 114 mg/dL


(70-99) 


 


 





 


Calcium Level


 9.0 mg/dL


(8.5-10.1) 


 


 





 


C-Reactive Protein,


Quantitative 52.0 mg/L


(0-3.3) 


 


 





 


Glucose (Fingerstick)


 


 119 mg/dL


(70-99) 127 mg/dL


(70-99) 126 mg/dL


(70-99)


 


Test


 3/10/20


20:42 3/11/20


03:35 3/11/20


07:32 3/11/20


11:44


 


Glucose (Fingerstick)


 194 mg/dL


(70-99) 


 139 mg/dL


(70-99) 148 mg/dL


(70-99)


 


White Blood Count


 


 6.8 x10^3/uL


(4.0-11.0) 


 





 


Red Blood Count


 


 3.07 x10^6/uL


(4.30-5.70) 


 





 


Hemoglobin


 


 10.4 g/dL


(13.0-17.5) 


 





 


Hematocrit


 


 30.2 %


(39.0-53.0) 


 





 


Mean Corpuscular Volume  98 fL ()   


 


Mean Corpuscular Hemoglobin  34 pg (25-35)   


 


Mean Corpuscular Hemoglobin


Concent 


 34 g/dL


(31-37) 


 





 


Red Cell Distribution Width


 


 13.4 %


(11.5-14.5) 


 





 


Platelet Count


 


 232 x10^3/uL


(140-400) 


 





 


Neutrophils (%) (Auto)  87 % (31-73)   


 


Lymphocytes (%) (Auto)  8 % (24-48)   


 


Monocytes (%) (Auto)  5 % (0-9)   


 


Eosinophils (%) (Auto)  0 % (0-3)   


 


Basophils (%) (Auto)  0 % (0-3)   


 


Neutrophils # (Auto)


 


 5.9 x10^3/uL


(1.8-7.7) 


 





 


Lymphocytes # (Auto)


 


 0.5 x10^3/uL


(1.0-4.8) 


 





 


Monocytes # (Auto)


 


 0.4 x10^3/uL


(0.0-1.1) 


 





 


Eosinophils # (Auto)


 


 0.0 x10^3/uL


(0.0-0.7) 


 





 


Basophils # (Auto)


 


 0.0 x10^3/uL


(0.0-0.2) 


 





 


Segmented Neutrophils %  87 % (35-66)   


 


Band Neutrophils %  2 % (0-9)   


 


Lymphocytes %  9 % (24-48)   


 


Monocytes %  2 % (0-10)   


 


Platelet Estimate


 


 Adequate


(ADEQUATE) 


 





 


Polychromasia  Slight   


 


Anisocytosis  Slight   








Laboratory Tests








Test


 3/10/20


14:37 3/10/20


16:19 3/10/20


20:42 3/11/20


03:35


 


Glucose (Fingerstick)


 127 mg/dL


(70-99) 126 mg/dL


(70-99) 194 mg/dL


(70-99) 





 


White Blood Count


 


 


 


 6.8 x10^3/uL


(4.0-11.0)


 


Red Blood Count


 


 


 


 3.07 x10^6/uL


(4.30-5.70)


 


Hemoglobin


 


 


 


 10.4 g/dL


(13.0-17.5)


 


Hematocrit


 


 


 


 30.2 %


(39.0-53.0)


 


Mean Corpuscular Volume    98 fL () 


 


Mean Corpuscular Hemoglobin    34 pg (25-35) 


 


Mean Corpuscular Hemoglobin


Concent 


 


 


 34 g/dL


(31-37)


 


Red Cell Distribution Width


 


 


 


 13.4 %


(11.5-14.5)


 


Platelet Count


 


 


 


 232 x10^3/uL


(140-400)


 


Neutrophils (%) (Auto)    87 % (31-73) 


 


Lymphocytes (%) (Auto)    8 % (24-48) 


 


Monocytes (%) (Auto)    5 % (0-9) 


 


Eosinophils (%) (Auto)    0 % (0-3) 


 


Basophils (%) (Auto)    0 % (0-3) 


 


Neutrophils # (Auto)


 


 


 


 5.9 x10^3/uL


(1.8-7.7)


 


Lymphocytes # (Auto)


 


 


 


 0.5 x10^3/uL


(1.0-4.8)


 


Monocytes # (Auto)


 


 


 


 0.4 x10^3/uL


(0.0-1.1)


 


Eosinophils # (Auto)


 


 


 


 0.0 x10^3/uL


(0.0-0.7)


 


Basophils # (Auto)


 


 


 


 0.0 x10^3/uL


(0.0-0.2)


 


Segmented Neutrophils %    87 % (35-66) 


 


Band Neutrophils %    2 % (0-9) 


 


Lymphocytes %    9 % (24-48) 


 


Monocytes %    2 % (0-10) 


 


Platelet Estimate


 


 


 


 Adequate


(ADEQUATE)


 


Polychromasia    Slight 


 


Anisocytosis    Slight 


 


Test


 3/11/20


07:32 3/11/20


11:44 


 





 


Glucose (Fingerstick)


 139 mg/dL


(70-99) 148 mg/dL


(70-99) 


 











Notes


Pt A&Ox3, eating breakfast. LEFT knee swelling down compared to last Friday when

I last saw him. Bandage partially saturated so after obtaining new dressing, 

removed and cleansed with chlorhexadine, 4x4 reapplied followed by ABD pads, 6" 

ACE wrap. Calf nontender bilterally, neg dimas's sign bilaterally. thigh 

nontender. No distress, Neurovascularly intact. DP pulses 2+. Labs: cultures 

negative with no growth. VS normal.


Problems:  


(1) Aftercare following left knee joint replacement surgery


Assessment and Plan


Monitor dressing and if drainage persists, then dressing change should occur. 


Dressing change daily. 


ICS,


No medicaiton changes.


PT/OT to maintain ROM, strength outpatient when discharged to home. 


Anticipate discharge today.











JOANNE [facility]INS [facility]       Mar 11, 2020 12:48 pm

## 2020-03-11 NOTE — DISCH
DISCHARGE INSTRUCTIONS


Condition on Discharge


Condition on Discharge:  Stable





Activity After Discharge


Activity Instructions for Disc:  Activity as tolerated, Progressive ambulation


Bathing Instructions:  Shower-keep dressing dry


Lifting Instructions after Dis:  No heavy lifting, Do not lift >10 pounds


Exercise Instruction after Dis:  Exercise per therapy, Progress as tolerated


Driving Instructions after Dis:  No driving for 2 weeks


Weight Bearing Status after Di:  As tolerated





Diet after Discharge


Diet after Discharge:  Regular


Diet Texture:  Regular


Liquid Texture:  Thin Liquid


Swallowing Supervision:  None needed





Wound Incision Care


Wound/Incision Care:  Ice to area for comfort, Keep wound/cast CDI, Do not 

change dressing





Checks after Discharge


Checks after discharge:  Check blood press - daily, Check blood sugar, ac/hs





Contacting the DR. after DC


Call your doctor for:  If your condition worsens





Follow-Up


Follow up with:  [facility]. Call [phone]











JOANNE GOINS [facility]        Mar 11, 2020 16:28

## 2020-08-19 ENCOUNTER — HOSPITAL ENCOUNTER (OUTPATIENT)
Dept: HOSPITAL 61 - KCIC | Age: 65
Discharge: HOME | End: 2020-08-19
Payer: COMMERCIAL

## 2020-08-19 DIAGNOSIS — Z79.899: ICD-10-CM

## 2020-08-19 DIAGNOSIS — J98.4: Primary | ICD-10-CM

## 2020-08-19 PROCEDURE — 71046 X-RAY EXAM CHEST 2 VIEWS: CPT

## 2020-08-20 NOTE — KCIC
EXAM: CHEST 2 VIEWS.

 

HISTORY: At risk medical therapy.

 

COMPARISON: 02/25/2020.

 

FINDINGS: Frontal and lateral views of the chest are obtained.

 

There are no confluent infiltrates. No interstitial changes are 

appreciated. There is a calcified granuloma in the left upper lobe. There 

is no pneumothorax or pleural effusion. The heart is not enlarged.

 

IMPRESSION:

1. No confluent infiltrates. 

 

Electronically signed by: MARILOU Azevedo MD (8/20/2020 8:51 AM) 

[id number]

## 2020-11-23 ENCOUNTER — HOSPITAL ENCOUNTER (OUTPATIENT)
Dept: HOSPITAL 61 - SURGPAT | Age: 65
End: 2020-11-23
Attending: ORTHOPAEDIC SURGERY
Payer: COMMERCIAL

## 2020-11-23 DIAGNOSIS — M17.11: ICD-10-CM

## 2020-11-23 DIAGNOSIS — Z01.812: Primary | ICD-10-CM

## 2020-11-23 DIAGNOSIS — Z96.611: ICD-10-CM

## 2020-11-23 LAB
ALBUMIN SERPL-MCNC: 3.4 G/DL (ref 3.4–5)
ANION GAP SERPL CALC-SCNC: 11 MMOL/L (ref 6–14)
APTT BLD: 31 SEC (ref 24–38)
BASOPHILS # BLD AUTO: 0.1 X10^3/UL (ref 0–0.2)
BASOPHILS NFR BLD: 1 % (ref 0–3)
BUN SERPL-MCNC: 18 MG/DL (ref 8–26)
CALCIUM SERPL-MCNC: 8.9 MG/DL (ref 8.5–10.1)
CHLORIDE SERPL-SCNC: 105 MMOL/L (ref 98–107)
CO2 SERPL-SCNC: 23 MMOL/L (ref 21–32)
CREAT SERPL-MCNC: 1.2 MG/DL (ref 0.7–1.3)
CRP SERPL-MCNC: 0.7 MG/L (ref 0–3.3)
EOSINOPHIL NFR BLD: 0.3 X10^3/UL (ref 0–0.7)
EOSINOPHIL NFR BLD: 4 % (ref 0–3)
ERYTHROCYTE [DISTWIDTH] IN BLOOD BY AUTOMATED COUNT: 13.6 % (ref 11.5–14.5)
GFR SERPLBLD BASED ON 1.73 SQ M-ARVRAT: 61 ML/MIN
GLUCOSE SERPL-MCNC: 153 MG/DL (ref 70–99)
HCT VFR BLD CALC: 35 % (ref 39–53)
HGB BLD-MCNC: 11.9 G/DL (ref 13–17.5)
LYMPHOCYTES # BLD: 1.7 X10^3/UL (ref 1–4.8)
LYMPHOCYTES NFR BLD AUTO: 20 % (ref 24–48)
MCH RBC QN AUTO: 33 PG (ref 25–35)
MCHC RBC AUTO-ENTMCNC: 34 G/DL (ref 31–37)
MCV RBC AUTO: 96 FL (ref 79–100)
MONO #: 0.5 X10^3/UL (ref 0–1.1)
MONOCYTES NFR BLD: 6 % (ref 0–9)
NEUT #: 5.7 X10^3/UL (ref 1.8–7.7)
NEUTROPHILS NFR BLD AUTO: 69 % (ref 31–73)
PLATELET # BLD AUTO: 260 X10^3/UL (ref 140–400)
POTASSIUM SERPL-SCNC: 4.4 MMOL/L (ref 3.5–5.1)
PROTHROMBIN TIME: 13.3 SEC (ref 11.7–14)
RBC # BLD AUTO: 3.63 X10^6/UL (ref 4.3–5.7)
SODIUM SERPL-SCNC: 139 MMOL/L (ref 136–145)
WBC # BLD AUTO: 8.2 X10^3/UL (ref 4–11)

## 2020-11-23 PROCEDURE — 86140 C-REACTIVE PROTEIN: CPT

## 2020-11-23 PROCEDURE — 83036 HEMOGLOBIN GLYCOSYLATED A1C: CPT

## 2020-11-23 PROCEDURE — 82040 ASSAY OF SERUM ALBUMIN: CPT

## 2020-11-23 PROCEDURE — 85610 PROTHROMBIN TIME: CPT

## 2020-11-23 PROCEDURE — 85025 COMPLETE CBC W/AUTO DIFF WBC: CPT

## 2020-11-23 PROCEDURE — 82306 VITAMIN D 25 HYDROXY: CPT

## 2020-11-23 PROCEDURE — 87641 MR-STAPH DNA AMP PROBE: CPT

## 2020-11-23 PROCEDURE — 85730 THROMBOPLASTIN TIME PARTIAL: CPT

## 2020-11-23 PROCEDURE — 80048 BASIC METABOLIC PNL TOTAL CA: CPT

## 2020-11-23 PROCEDURE — 36415 COLL VENOUS BLD VENIPUNCTURE: CPT

## 2020-11-24 LAB — HBA1C MFR BLD: 5.8 % (ref 4.8–5.6)

## 2020-12-10 ENCOUNTER — HOSPITAL ENCOUNTER (OUTPATIENT)
Dept: HOSPITAL 61 - LAB | Age: 65
End: 2020-12-10
Attending: ORTHOPAEDIC SURGERY
Payer: COMMERCIAL

## 2020-12-10 DIAGNOSIS — Z20.828: ICD-10-CM

## 2020-12-10 DIAGNOSIS — Z01.812: Primary | ICD-10-CM

## 2020-12-10 PROCEDURE — U0003 INFECTIOUS AGENT DETECTION BY NUCLEIC ACID (DNA OR RNA); SEVERE ACUTE RESPIRATORY SYNDROME CORONAVIRUS 2 (SARS-COV-2) (CORONAVIRUS DISEASE [COVID-19]), AMPLIFIED PROBE TECHNIQUE, MAKING USE OF HIGH THROUGHPUT TECHNOLOGIES AS DESCRIBED BY CMS-2020-01-R: HCPCS

## 2020-12-13 NOTE — PDOC1
History and Physical


Date of Admission


Date of Admission


2020





Identification/Chief Complaint


Chief Complaint


right knee osteoarthritis pain





Source


Source:  Chart review, Patient





History of Present Illness


History of Present Illness


65 year old with osteoarthritis of his right knee.  Walking is painful. Pain 

interferes with ADLs, he has daily symptoms, and affects his quality of life. 

Had left TKA in 2020 which is doing well now, although he had a washout 

about 1 week postop due to hemarthrosis. Here for elective R TKA





Past Medical History


Past Medical History


psoriasis


tubular adenoma


psoriatic arthritis


HTN


sleep apnea


hyperlipidemia


Cardiovascular:  HTN, Hyperlipidemia


Pulmonary:  Other


Psych:  Depression


Musculoskeletal:  Osteoarthritis


Dermatology:  Psoriasis





Past Surgical History


Past Surgical History:  Total knee replacement, Other





Family History


Family History


parents are both 


Family History:  No Significant





Social History


Smoke:  Quit


ALCOHOL:  heavy


Drugs:  None





Current Medications


Current Medications





Current Medications


Ondansetron HCl (Zofran) 4 mg PRN Q6HRS  PRN IV NAUSEA/VOMITING;  Start 20

at 07:00;  Stop 12/15/20 at 06:59


Fentanyl Citrate (Fentanyl 2ml Vial) 25 mcg PRN Q5MIN  PRN IV MILD PAIN 1-3;  

Start 20 at 07:00;  Stop 12/15/20 at 06:59


Fentanyl Citrate (Fentanyl 2ml Vial) 50 mcg PRN Q5MIN  PRN IV MODERATE TO SEVERE

PAIN;  Start 20 at 07:00;  Stop 12/15/20 at 06:59


Morphine Sulfate (Morphine Sulfate) 1 mg PRN Q10MIN  PRN IV SEVERE PAIN 7-10;  

Start 20 at 07:00;  Stop 12/15/20 at 06:59


Ringer's Solution 1,000 ml @  30 mls/hr Q24H IV ;  Start 20 at 07:00;  

Stop 20 at 18:59


Lidocaine HCl (Xylocaine-Mpf 1% 2ml Vial) 2 ml PRN 1X  PRN ID PRIOR TO IV START;

 Start 20 at 07:00;  Stop 12/15/20 at 06:59


Hydromorphone HCl (Dilaudid) 0.5 mg PRN Q10MIN  PRN IV SEV PAIN, Second choice; 

Start 20 at 07:00;  Stop 12/15/20 at 06:59


Prochlorperazine Edisylate (Compazine) 5 mg PACU PRN  PRN IV NAUSEA, MRX1;  

Start 20 at 07:00;  Stop 12/15/20 at 06:59


Aspirin (Claudette Aspirin) 325 mg BID PO ;  Start 20 at 21:00


Celecoxib (CeleBREX) 400 mg 1X PREOP  PRN PO PRIOR TO SURGERY;  Start 20 

at 08:00


Acetaminophen (Tylenol) 1,000 mg 1X PREOP  PRN PO PRIOR TO PROCEDURE;  Start 

20 at 06:00;  Stop 20 at 18:00


Cefazolin Sodium/ Dextrose 50 ml @  100 mls/hr 1X PREOP  PRN IV PRIOR TO 

PROCEDURE;  Start 20 at 06:00;  Stop 20 at 18:00


Vancomycin HCl 250 ml @  250 mls/hr 1X PREOP  PRN IV PRIOR TO PROCEDURE;  Start 

20 at 06:00;  Stop 20 at 18:00


Morphine Sulfate 5 mg/Ketorolac Tromethamine 30 mg/Ropivacaine 60 ml/Epinephrine

HCl 0.5 mg/Sodium Chloride 100 ml @  100 mls/hr 1X PERIOP  ONCE INT ART ;  Start

20 at 06:00;  Stop 20 at 06:59


Tranexamic Acid 1000 mg/Sodium Chloride 60 ml @ 60 mls/hr 1X PERIOP  ONCE INJ ; 

Start 20 at 06:00;  Stop 20 at 06:59


Tranexamic Acid 1000 mg/Sodium Chloride 60 ml @ 60 mls/hr 1X PERIOP  ONCE INJ ; 

Start 20 at 08:00;  Stop 20 at 08:59





Active Scripts


Active


Reported


Omeprazole 20 Mg Tablet.dr 1 Tab PO DAILY


Aspirin 81 Mg Tab.chew 1 Tab PO DAILY


Lisinopril 10 Mg Tablet 1 Tab PO DAILY


Amlodipine Besylate 5 Mg Tablet 5 Mg PO DAILY


Metoprolol Succinate ( Xl ) (Metoprolol Succinate) 25 Mg Tab.er.24h 1 Tab PO 

DAILY


Atorvastatin Calcium 40 Mg Tablet 1 Tab PO DAILY


Metformin HCl 500 Mg/5 Ml Solution 500 Mg PO BIDAC


Zetia (Ezetimibe) 10 Mg Tablet 1 Tab PO DAILY 30 Days


Celebrex (Celecoxib) 200 Mg Capsule 1 Cap PO DAILY





Allergies


Allergies:  


Coded Allergies:  


     risankizumab-rzaa (Verified  Allergy, Severe, RASH, 20)


     atorvastatin (Verified  Allergy, Intermediate, 20)


     venlafaxine (Verified  Allergy, Intermediate, Unknown, 20)


     I S O L A T I O N *CONTACT* (Verified  Allergy, Unknown, 20)


   mrsa


     bupropion (Verified  Adverse Reaction, Intermediate, 20)


   CAUSED INCREASED PAIN


     sertraline (Verified  Adverse Reaction, Intermediate, Unknown, 20)





Physical Exam


General:  Alert, Cooperative


HEENT:  Atraumatic


Lungs:  Normal air movement


Heart:  RRR


Abdomen:  Soft


Extremities:  Other (The RIGHT knee shows a [mildly antalgic] gait.  There is 

[varus] alignment.  No masses.  [No detectable] effusion.  Tenderness on the 

joint lines.  Range of motion is [5-115] degrees.  There is crepitus with range 

of motion, and pain at the extremes of motion.  The knee is stable to varus and 

valgus stress without subluxation or laxity.  Muscle strength is slightly weak 

for the quadriceps 4+/5 which may be due to pain or avoidance, and does not seem

neurogenic, and the muscle tone and bulk is slightly decreased. The hamstring 

strength is 5/5.   The skin is normal with no scars, rashes, lesions or ulcers. 

Light touch sensation is intact. No edema and no varicosities.  Dorsalis pedis 

pulse is intact and capillary refill is normal.)


Neuro:  Normal speech, Sensation intact





Images


Images


PATIENT: JAMEE RAMÍREZ JACCOUNT: [id number]     MRN#: [id number]


: 1955           LOCATION: [facility]        AGE: 65


SEX: M                    EXAM DT: 20         ACCESSION#: [id number]


STATUS: PRE CLI           ORD. PHYSICIAN: MIRA CASTAÑEDA MD


REASON: 


PROCEDURE: KNEE RIGHT 2V





EXAM: AP view both knees, lateral and tangential patellar view right knee


 


DATE: 2020 12:00 AM


 


INDICATION: Reason: RIGHT KNEE PAIN / Spl. Instructions:  / History:  


 


COMPARISON: 3/3/2020


 


FINDINGS:


Severe right knee joint osteophytes arthritis with medial compartment 


joint space effacement and tricompartmental osteophytes. Neutral patellar 


tracking. Small right knee joint effusion. Vascular calcifications are 


seen.


 


Single AP view left knee demonstrates changes of left total knee 


arthroplasty, incompletely assessed. Subtle periprosthetic lucency at the 


medial aspect of the medial tibial plateau may be projectional and can be 


further assessed by dedicated left knee radiographs if clinically 


indicated.


 


IMPRESSION:


1.  Severe right knee joint osteoarthritis.


2.  No evidence of acute fracture or dislocation.


3.  Equivocal periprosthetic lucency subjacent to the medial tibial 


component, can be assessed by dedicated left knee radiographs if 


clinically indicated.


 


Electronically signed by: Armand Flower MD (2020 4:53 PM) [id number]














DICTATED and SIGNED BY:     ARMAND FLOWER MD


DATE:     20 1653





VTE Prophylaxis Ordered


VTE Prophylaxis Devices:  Yes


VTE Pharmacological Prophylaxi:  Yes





Assessment/Plan


Assessment/Plan


We discussed RBA of total knee arthroplasty. He tried nonop treatment without 

success. He is here today for elective TKA.





Justifications for Admission


Other Justification














MIRA CASTAÑEDA MD                 Dec 13, 2020 18:39

## 2020-12-14 ENCOUNTER — HOSPITAL ENCOUNTER (OUTPATIENT)
Dept: HOSPITAL 61 - SURG | Age: 65
Setting detail: OBSERVATION
LOS: 2 days | Discharge: HOME HEALTH | End: 2020-12-16
Attending: ORTHOPAEDIC SURGERY | Admitting: ORTHOPAEDIC SURGERY
Payer: COMMERCIAL

## 2020-12-14 VITALS — DIASTOLIC BLOOD PRESSURE: 75 MMHG | SYSTOLIC BLOOD PRESSURE: 143 MMHG

## 2020-12-14 VITALS — DIASTOLIC BLOOD PRESSURE: 76 MMHG | SYSTOLIC BLOOD PRESSURE: 137 MMHG

## 2020-12-14 VITALS — WEIGHT: 252 LBS | BODY MASS INDEX: 35.28 KG/M2 | HEIGHT: 71 IN

## 2020-12-14 VITALS — SYSTOLIC BLOOD PRESSURE: 136 MMHG | DIASTOLIC BLOOD PRESSURE: 74 MMHG

## 2020-12-14 VITALS — DIASTOLIC BLOOD PRESSURE: 71 MMHG | SYSTOLIC BLOOD PRESSURE: 145 MMHG

## 2020-12-14 VITALS — SYSTOLIC BLOOD PRESSURE: 135 MMHG | DIASTOLIC BLOOD PRESSURE: 75 MMHG

## 2020-12-14 DIAGNOSIS — L40.50: ICD-10-CM

## 2020-12-14 DIAGNOSIS — G47.30: ICD-10-CM

## 2020-12-14 DIAGNOSIS — F32.9: ICD-10-CM

## 2020-12-14 DIAGNOSIS — M17.11: Primary | ICD-10-CM

## 2020-12-14 DIAGNOSIS — M65.9: ICD-10-CM

## 2020-12-14 DIAGNOSIS — I10: ICD-10-CM

## 2020-12-14 DIAGNOSIS — L40.9: ICD-10-CM

## 2020-12-14 DIAGNOSIS — Z79.82: ICD-10-CM

## 2020-12-14 DIAGNOSIS — E78.5: ICD-10-CM

## 2020-12-14 DIAGNOSIS — Z87.891: ICD-10-CM

## 2020-12-14 DIAGNOSIS — Z96.652: ICD-10-CM

## 2020-12-14 DIAGNOSIS — Z79.01: ICD-10-CM

## 2020-12-14 PROCEDURE — 88311 DECALCIFY TISSUE: CPT

## 2020-12-14 PROCEDURE — 96365 THER/PROPH/DIAG IV INF INIT: CPT

## 2020-12-14 PROCEDURE — 85610 PROTHROMBIN TIME: CPT

## 2020-12-14 PROCEDURE — 97165 OT EVAL LOW COMPLEX 30 MIN: CPT

## 2020-12-14 PROCEDURE — 85014 HEMATOCRIT: CPT

## 2020-12-14 PROCEDURE — 96375 TX/PRO/DX INJ NEW DRUG ADDON: CPT

## 2020-12-14 PROCEDURE — 86850 RBC ANTIBODY SCREEN: CPT

## 2020-12-14 PROCEDURE — 86901 BLOOD TYPING SEROLOGIC RH(D): CPT

## 2020-12-14 PROCEDURE — 86900 BLOOD TYPING SEROLOGIC ABO: CPT

## 2020-12-14 PROCEDURE — 85018 HEMOGLOBIN: CPT

## 2020-12-14 PROCEDURE — 36415 COLL VENOUS BLD VENIPUNCTURE: CPT

## 2020-12-14 PROCEDURE — 97535 SELF CARE MNGMENT TRAINING: CPT

## 2020-12-14 PROCEDURE — 96366 THER/PROPH/DIAG IV INF ADDON: CPT

## 2020-12-14 PROCEDURE — G0378 HOSPITAL OBSERVATION PER HR: HCPCS

## 2020-12-14 PROCEDURE — 97150 GROUP THERAPEUTIC PROCEDURES: CPT

## 2020-12-14 PROCEDURE — 96376 TX/PRO/DX INJ SAME DRUG ADON: CPT

## 2020-12-14 PROCEDURE — G0379 DIRECT REFER HOSPITAL OBSERV: HCPCS

## 2020-12-14 PROCEDURE — A4461 SURGICL DRESS HOLD NON-REUSE: HCPCS

## 2020-12-14 PROCEDURE — 88305 TISSUE EXAM BY PATHOLOGIST: CPT

## 2020-12-14 PROCEDURE — 96361 HYDRATE IV INFUSION ADD-ON: CPT

## 2020-12-14 PROCEDURE — 27447 TOTAL KNEE ARTHROPLASTY: CPT

## 2020-12-14 PROCEDURE — 96368 THER/DIAG CONCURRENT INF: CPT

## 2020-12-14 PROCEDURE — 97116 GAIT TRAINING THERAPY: CPT

## 2020-12-14 PROCEDURE — C1769 GUIDE WIRE: HCPCS

## 2020-12-14 PROCEDURE — 73560 X-RAY EXAM OF KNEE 1 OR 2: CPT

## 2020-12-14 PROCEDURE — 82962 GLUCOSE BLOOD TEST: CPT

## 2020-12-14 PROCEDURE — 97530 THERAPEUTIC ACTIVITIES: CPT

## 2020-12-14 PROCEDURE — 97162 PT EVAL MOD COMPLEX 30 MIN: CPT

## 2020-12-14 PROCEDURE — 96367 TX/PROPH/DG ADDL SEQ IV INF: CPT

## 2020-12-14 RX ADMIN — ASPIRIN SCH MG: 325 TABLET, DELAYED RELEASE ORAL at 19:39

## 2020-12-14 RX ADMIN — MORPHINE SULFATE PRN MG: 4 INJECTION, SOLUTION INTRAMUSCULAR; INTRAVENOUS at 23:23

## 2020-12-14 RX ADMIN — INSULIN LISPRO SCH UNITS: 100 INJECTION, SOLUTION INTRAVENOUS; SUBCUTANEOUS at 17:00

## 2020-12-14 RX ADMIN — KETOROLAC TROMETHAMINE SCH MLS/HR: 30 INJECTION, SOLUTION INTRAMUSCULAR at 17:42

## 2020-12-14 RX ADMIN — ONDANSETRON SCH MG: 4 TABLET, ORALLY DISINTEGRATING ORAL at 23:46

## 2020-12-14 RX ADMIN — FENTANYL CITRATE PRN MCG: 50 INJECTION INTRAMUSCULAR; INTRAVENOUS at 15:20

## 2020-12-14 RX ADMIN — ASPIRIN 325 MG ORAL TABLET SCH MG: 325 PILL ORAL at 19:45

## 2020-12-14 RX ADMIN — FENTANYL CITRATE PRN MCG: 50 INJECTION INTRAMUSCULAR; INTRAVENOUS at 14:29

## 2020-12-14 RX ADMIN — OXYCODONE HYDROCHLORIDE AND ACETAMINOPHEN PRN TAB: 10; 325 TABLET ORAL at 21:02

## 2020-12-14 RX ADMIN — ONDANSETRON SCH MG: 2 INJECTION INTRAMUSCULAR; INTRAVENOUS at 17:43

## 2020-12-14 RX ADMIN — FENTANYL CITRATE PRN MCG: 50 INJECTION INTRAMUSCULAR; INTRAVENOUS at 14:56

## 2020-12-14 RX ADMIN — ONDANSETRON SCH MG: 4 TABLET, ORALLY DISINTEGRATING ORAL at 17:43

## 2020-12-14 RX ADMIN — FENTANYL CITRATE PRN MCG: 50 INJECTION INTRAMUSCULAR; INTRAVENOUS at 14:35

## 2020-12-14 RX ADMIN — MORPHINE SULFATE PRN MG: 4 INJECTION, SOLUTION INTRAMUSCULAR; INTRAVENOUS at 19:40

## 2020-12-14 RX ADMIN — ONDANSETRON SCH MG: 2 INJECTION INTRAMUSCULAR; INTRAVENOUS at 23:46

## 2020-12-14 NOTE — RAD
Two-view right knee dated 12/14/2020.



No comparison available.



CLINICAL INDICATION: Postop total knee arthroplasty



FINDINGS:



2 views performed. Interval total knee arthroplasty. Femoral and tibial components are intact. No per
iprosthetic fracture or malalignment. Posterior changes of the patella. Diffuse soft tissue swelling 
and soft tissue gas with suprapatellar drain in place.



IMPRESSION:



Status post right knee arthroplasty.







Electronically signed by: Adalberto Padilla MD (12/14/2020 2:48 PM) [id number]

## 2020-12-14 NOTE — NUR
Rec'd from PACU per bed, alert/oriented, dressing clean, dry & intact to RLE, Hemovac & IAC 
in place, IVF infusing into left forearm, AUNG hose & SCD on LLE, CHINMAY on RLE, has own walker 
plans Outpatient Therapy @ [facility] as previous, has CPAP for bedtime use, oriented 
to surroundings, call light within reach, see admission for details.

## 2020-12-14 NOTE — PDOC4
Operative Note


Operative Note


Date of Procedure:  December 14, 2020


Pre-Op Diagnosis:  Unilateral primary osteoarthritis, right knee.  M17.11 


Post-Op Diagnosis:  same


Procedure: right total knee arthroplasty with patella resurfacing, robotic 

assisted, CPT 30447


Surgeon:  Mira Castañeda MD 


Assistant:  PAULA Jorge 


Anesthesia:  General


EBL: 100 mL


Specimens Obtained:  right knee bone and soft tissue


Complications:  none


Drains:  Hemovac plus pain catheter


Tourniquet time:  60 Minutes 


Tourniquet Pressure:  350 mm Hg





Indications for Procedure: Knee arthritis pain, affecting quality of life, 

unrelieved by nonoperative management





Findings: Severe osteoarthritis with bone on bone contact medially with full 

thickness cartilage loss in the patellofemoral and lateral compartments.  Some 

synovitis but to my recollection this knee did not have nearly the amount of 

synovitis and inflammation as the left knee.  This knee seemed like a more 

straightforward arthritic knee. (My recollection of the left knee was dense 

sclerotic bone and severe synovitis.)  There was a fixed varus deformity which 

required osteophyte excision medially and medial soft tissue release.





Implants: Smith & Nephew Journey II Total Knee System, Size 6 right bicruciate 

stabilized Journey II BCS Oxinium femoral component, size 6 right Journey nonpo

catalina tibial baseplate, size 5-6  13 mm  right Journey II BCS XLPE articular 

insert, 35 mm oval Alayna II resurfacing patellar component 





Procedure in Detail:


The patient was identified in the preoperative holding area, and the correct 

right lower extremity was marked by me. The patient was taken to the operating 

room where the patient was anesthetized by the Department of Anesthesia. 

Preoperative antibiotics were given intravenously. Tranexamic acid 1 g was given

intravenously for intraoperative hemostasis. A "time-out" procedure was 

performed. The patient was positioned supine on the operative table with a 

tourniquet on the upper right thigh. A right  hip bump and heel bump were 

attached to the operating table for later intraoperative positioning. The right 

lower limb was thoroughly scrubbed, then sterile Chloraprep solution was 

applied, and the limb was draped in sterile fashion. The operating team wore 

exhaust ventilated hoods with Angelantoni Personal Protection Toga Zippered 

Peel-Away protection system.


An impervious stockinet and an adhesive drape were used such that the skin was 

entirely covered. The limb was exsanguinated with an Esmarch bandage, and the 

tourniquet was inflated.  A midline skin incision was made with a scalpel using 

the patella and tibial tubercle as landmarks. Electrocautery was used for 

hemostasis. My assistant used rake retractors and a laparotomy sponge.





A medial parapatellar arthrotomy incision was used with extension into the 

distal quadriceps tendon. The patella was retracted laterally and Hohmann 

retractors were now used by my assistant. Excess synovium, the menisci, and the 

cruciate ligaments were resected sharply.  A periarticular multimodal 

ropivacaine anesthetic injection was used in the suprapatellar pouch and distal 

quadriceps muscle.





The patella was everted and exposed. The patella thickness was measured with a 

caliper, and then cut freehand with a saw, using caliper measurements to assess 

the resection.  The lateral retinaculum was partially released from the lateral 

patella using electrocautery. Rongeurs were used to make sure there were no 

remaining exposed patellar osteophytes medially or laterally.   The patella was 

sized, and then drilled for an oval three-peg patella component.  





The tibial tracker array for the NAVIO system was applied to the tibial crest 

four finger breadths below the tibial tubercle, using percutaneous incisions and

bicortical pins. The femoral tracker array was applied outside of the original 

incision using two separate stab incisions using bicortical pins. Checkpoint 

verification pins were applied to the femur and tibia.





Using the point probe, the medial and lateral malleoli were localized and the 

locations were stored. The center of the tibia was noted at the anterior 

cruciate ligament insertion and stored.  The center of the femur was marked at 

the intersection of Whitesidess line with the transepicondylar axis.  The hip 

center calculation was performed with range of motion of the hip. The femur 

neutral position was identified, and simulated weightbearing was performed with 

axial compression on the foot. Range of motion without stress was performed and 

the data collected. Range of motion with valgus stress, and range of motion with

varus stress data collection was also performed. Rotational references include 

the Whitesidess line, and the trans-epicondylar axis.





The femoral articular surface was now mapped in 3 dimensions using the point 

probe and digital data collected. The tibial condyle articular surfaces and 

cortical edges were mapped in 3 dimensions using the point probe including the 

medial and lateral tibial plateau.





Implant planning was now performed on-screen with manipulation of the implant 

sizes, cut thicknesses and gaps, component rotation, component flexion/extension

and component varus/valgus until satisfactory ligament balance, alignment and 

stability of the knee was expected throughout the range of motion.   A medial 

release was required, using a 10 blade scalpel, and a Polanco elevator to elevate 

the medial structures from the upper medial tibia.  





My assistant held a Hohmann retractor, a medial Z-retractor, and an Army-Navy 

retractor to protect the medial and lateral collateral ligaments, the patellar 

tendon, the skin and the other soft tissues.  The point probe was used to 

confirm the location of the checkpoint verification pins.  The distal femoral 

surface was now prepared using the Anspach aislinn with footpedal, and the NAVIO 

handpiece for bone removal to the previously planned distal femoral resection.  

The crosshairs at the pin locations were marked by using a mallet and the point 

probe for definitive location.





A 5-in-1 Journey II cutting guide was then applied and the position was checked 

with the virtual brandon wing from the NAVIO to ensure proper placement as the 

pins were applied.   The posterior, anterior, and all chamfer cuts were made 

with the oscillating saw.  Excess bone was removed with an osteotome and 

rongeurs.





The tibial cutting guide was applied, positioned using the NAVIO virtual brandon 

wing, and secured to the upper tibia using three pins at the previously planned 

location. The virtual brandon wing was used to confirm the resection depth, slope 

and coronal alignment. The upper tibia was cut made with an oscillating saw.  My

assistant held Hohmann retractors and a posterior cruciate ligament retractor to

protect the medial and lateral collateral ligaments, the patellar tendon, the 

skin, the peroneal nerve and the other soft tissues. The upper tibia was sized 

with a trial baseplate. The posterior compartment was cleared of osteophytes and

loose bodies. The periarticular anesthetic injection was used in the posterior 

compartment.





The box cut for a posterior stabilized component was made.  A preliminary 

reduction was performed with a trial femur, trial tibial baseplate and trial 

polyethylene.  The NAVIO system was used to confirm range of motion, and 

postoperative stressed gap assessment. The stability was assessed using 

different thicknesses of tibial articular surface to find satisfactory stability

and good range of motion.  The rotation of the tibial component was marked on 

the upper tibia. Final trial reduction was now performed verifying patella 

tracking and tibiofemoral stability and alignment. The bone pins and tracker 

arrays were removed, and the checkpoint verification pins were removed.





The tibia preparation was completed with a drill, saw, and fin punch at the 

previously noted rotation. The final implants were verified and opened. Outer 

gloves were changed by the operating team. Betadine lavage was used.  The bone 

cuts were irrigated with saline using the CircleBuilder InterPulse device and then 

dried with suction and laparotomy sponges. 





Two packages of Smith + Nephew Rally HV bone cement were mixed in powdered form 

with Vancomycin 1gm and Tobramycin 1.2 gm, and then vacuum-mixed with the 

monomer, and placed into a cement gun. The cut surfaces of the bone were 

thoroughly dried with suction and with laparotomy sponges for cement 

interdigitation. 





The final components were cemented into place. The knee was kept at full 

extension while the cement hardened, and excess cement was removed.  Tranexamic 

acid 1 g was redosed intravenously for additional intraoperative hemostasis.  

The tourniquet was released, and electrocautery was used for hemostasis.  A 

final periarticular anesthetic injection was used for pain relief.  The bone pin

sites on the tibial crest were closed with #3-0 Nylon sutures.





A final check of range-of-motion and stability was made, and the polyethylene 

implant final size was chosen.  The polyethylene implant was secured to the 

tibial baseplate, and the knee was reduced a final time and range of motion and 

stability was confirmed.  Thorough irrigation was used. 





 A pain catheter, and a 10 Fr Hemovac were inserted.  Topical Vancomycin 1 gm 

was used during the closure.  The arthrotomy was closed with interrupted 

figure-of-eight #1 Vicryl suture.  





The subcutaneous tissues were approximated initially with #2-0 Vicryl inverted 

interrupted sutures by my assistant.  Next the subcuticular layer was approxima

kash in a running fashion with #3-0 STRATAFIX suture by my assistant.   The skin 

incision was then covered and reinforced by my assistant with Acticoat, followed

by a MILAGROS single use negative pressure wound therapy dressing  Soft roll and an 

Ace wrap were applied. Needle and sponge counts were correct. There were no 

apparent complications. The patient returned to the recovery room in stable 

condition.











MIRA CASTAÑEDA MD                 Dec 14, 2020 14:11

## 2020-12-14 NOTE — NUR
requesting pain medication states that it all of sudden was apparent. medicated with 
Morphine. continues to sit up in recliner.

## 2020-12-14 NOTE — NUR
wife at bedside. Faraz is up in the recliner. He has good motion, sensation and pulses 
bilateral lower extremities. no complaints of pain at this time

## 2020-12-15 VITALS — SYSTOLIC BLOOD PRESSURE: 137 MMHG | DIASTOLIC BLOOD PRESSURE: 74 MMHG

## 2020-12-15 VITALS — SYSTOLIC BLOOD PRESSURE: 131 MMHG | DIASTOLIC BLOOD PRESSURE: 50 MMHG

## 2020-12-15 VITALS — DIASTOLIC BLOOD PRESSURE: 72 MMHG | SYSTOLIC BLOOD PRESSURE: 126 MMHG

## 2020-12-15 LAB — PROTHROMBIN TIME: 13.7 SEC (ref 11.7–14)

## 2020-12-15 RX ADMIN — ACETAMINOPHEN SCH MG: 500 TABLET ORAL at 15:00

## 2020-12-15 RX ADMIN — PANTOPRAZOLE SODIUM SCH MG: 40 TABLET, DELAYED RELEASE ORAL at 05:55

## 2020-12-15 RX ADMIN — ONDANSETRON SCH MG: 4 TABLET, ORALLY DISINTEGRATING ORAL at 06:00

## 2020-12-15 RX ADMIN — ACETAMINOPHEN SCH MG: 500 TABLET ORAL at 21:00

## 2020-12-15 RX ADMIN — OXYCODONE HYDROCHLORIDE AND ACETAMINOPHEN PRN TAB: 10; 325 TABLET ORAL at 20:55

## 2020-12-15 RX ADMIN — OXYCODONE HYDROCHLORIDE AND ACETAMINOPHEN PRN TAB: 10; 325 TABLET ORAL at 03:26

## 2020-12-15 RX ADMIN — INSULIN LISPRO SCH UNITS: 100 INJECTION, SOLUTION INTRAVENOUS; SUBCUTANEOUS at 12:00

## 2020-12-15 RX ADMIN — SENNOSIDES AND DOCUSATE SODIUM SCH TAB: 8.6; 5 TABLET ORAL at 08:17

## 2020-12-15 RX ADMIN — OXYCODONE HYDROCHLORIDE AND ACETAMINOPHEN PRN TAB: 10; 325 TABLET ORAL at 08:17

## 2020-12-15 RX ADMIN — OXYCODONE HYDROCHLORIDE AND ACETAMINOPHEN PRN TAB: 10; 325 TABLET ORAL at 12:25

## 2020-12-15 RX ADMIN — ONDANSETRON SCH MG: 2 INJECTION INTRAMUSCULAR; INTRAVENOUS at 05:55

## 2020-12-15 RX ADMIN — ASPIRIN SCH MG: 325 TABLET, DELAYED RELEASE ORAL at 08:19

## 2020-12-15 RX ADMIN — ONDANSETRON SCH MG: 2 INJECTION INTRAMUSCULAR; INTRAVENOUS at 12:00

## 2020-12-15 RX ADMIN — ACETAMINOPHEN SCH MG: 500 TABLET ORAL at 08:19

## 2020-12-15 RX ADMIN — ASPIRIN 325 MG ORAL TABLET SCH MG: 325 PILL ORAL at 08:23

## 2020-12-15 RX ADMIN — INSULIN LISPRO SCH UNITS: 100 INJECTION, SOLUTION INTRAVENOUS; SUBCUTANEOUS at 17:00

## 2020-12-15 RX ADMIN — MULTIPLE VITAMINS W/ MINERALS TAB SCH TAB: TAB at 08:23

## 2020-12-15 RX ADMIN — KETOROLAC TROMETHAMINE SCH MLS/HR: 30 INJECTION, SOLUTION INTRAMUSCULAR at 05:55

## 2020-12-15 RX ADMIN — CELECOXIB SCH MG: 100 CAPSULE ORAL at 08:18

## 2020-12-15 RX ADMIN — ONDANSETRON SCH MG: 4 TABLET, ORALLY DISINTEGRATING ORAL at 12:00

## 2020-12-15 RX ADMIN — METOPROLOL SUCCINATE SCH MG: 25 TABLET, EXTENDED RELEASE ORAL at 08:18

## 2020-12-15 RX ADMIN — INSULIN LISPRO SCH UNITS: 100 INJECTION, SOLUTION INTRAVENOUS; SUBCUTANEOUS at 08:00

## 2020-12-15 RX ADMIN — LISINOPRIL SCH MG: 10 TABLET ORAL at 08:18

## 2020-12-15 RX ADMIN — ASPIRIN SCH MG: 325 TABLET, DELAYED RELEASE ORAL at 20:54

## 2020-12-15 RX ADMIN — EZETIMIBE SCH MG: 10 TABLET ORAL at 08:18

## 2020-12-15 RX ADMIN — OXYCODONE HYDROCHLORIDE AND ACETAMINOPHEN PRN TAB: 10; 325 TABLET ORAL at 16:46

## 2020-12-15 NOTE — NUR
ACE wrap removed after last therapy session. Hemovac and IAC discontinued with only a small 
amount of bleeding present. Foam dressing applied over the areas. MILAGROS dressing with a small 
amount of old blood noted at the bottom of the MILAGROS dressing. Slight bruising and swelling 
noted. Will monitor.

## 2020-12-15 NOTE — PDOC
PROGRESS NOTES


Date of Service


DATE: 12/15/20 


TIME: 17:55





Subjective


Subjective


Quite a bit of pain at first. Received IV pain meds to control.





Objective


Vital Signs





Vital Signs








  Date Time  Temp Pulse Resp B/P (MAP) Pulse Ox O2 Delivery O2 Flow Rate FiO2


 


12/15/20 17:46     95 Room Air  


 


12/15/20 08:19  62  126/72    


 


12/15/20 07:13 98.1  20     





 98.1       


 


20 16:45       2.0 








Physical Exam


MILAGROS intact and dry. Pain catheter and Hemovac have been removed. Calf soft and 

NT. Good AROM of foot and toes. Intact sensation distally. No sign of 

compartment syndrome or DVT. No blisters.


Labs





Laboratory Tests








Test


 20


11:02 20


15:07 12/15/20


07:02 12/15/20


07:20


 


Glucose (Fingerstick)


 103 mg/dL


(70-99) 156 mg/dL


(70-99) 119 mg/dL


(70-99) 





 


Prothrombin Time


 


 


 


 13.7 SEC


(11.7-14.0)


 


Prothromb Time International


Ratio 


 


 


 1.1 (0.8-1.1) 





 


Test


 12/15/20


16:35 


 


 





 


Glucose (Fingerstick)


 151 mg/dL


(70-99) 


 


 











Laboratory Tests








Test


 12/15/20


07:02 12/15/20


07:20 12/15/20


16:35


 


Glucose (Fingerstick)


 119 mg/dL


(70-99) 


 151 mg/dL


(70-99)


 


Prothrombin Time


 


 13.7 SEC


(11.7-14.0) 





 


Prothromb Time International


Ratio 


 1.1 (0.8-1.1) 


 











Imaging


Report reviewed, images independently reviewed. Satisfactory TKA without 

apparent complications.





[facility]  


                              [address]  [place], [place] [id number] [phone]  


 


                                           IMAGING REPORT  


 


                                               Signed  


 


PATIENT: ADALBERTO RAMÍREZ      ACCOUNT: [id number]            MRN#: 

[id number]  


: 1955                  LOCATION: [address] [place] [address]             AGE: 65  


SEX: M                           EXAM DT: 20                ACCESSION#: 

[id number]  


STATUS: ADM IN                   ORD. PHYSICIAN: MIRA CASTAÑEDA MD     


REASON: POST OP  


PROCEDURE: KNEE RIGHT 2V  


 


Two-view right knee dated 2020.  


 


 No comparison available.  


 


 CLINICAL INDICATION: Postop total knee arthroplasty  


 


 FINDINGS:  


 


 2 views performed. Interval total knee arthroplasty. Femoral and tibial 

components are intact. No 


periprosthetic fracture or malalignment. Posterior changes of the patella. 

Diffuse soft tissue 


swelling and soft tissue gas with suprapatellar drain in place.  


 


 IMPRESSION:  


 


 Status post right knee arthroplasty.  


 


 


 


 Electronically signed by: Adalberto Padilla MD (2020 2:48 PM) [id number]  


 


 


 


 


DICTATED and SIGNED BY:     ADALBERTO PADILLA MD  


DATE:     20 1446





Assessment


Assessment


POD# 1 after TKA





Plan


Plan of Care


Continue PT.  Not yet able to get out of bed without assistance. Need to watch 

incision for blisters.  IV pain meds if necessary.





Justicifation of Admission Dx:


Justifications for Admission:


Justification of Admission Dx:  Yes











MIRA CASTAÑEDA MD                 Dec 15, 2020 17:56

## 2020-12-16 VITALS — SYSTOLIC BLOOD PRESSURE: 117 MMHG | DIASTOLIC BLOOD PRESSURE: 56 MMHG

## 2020-12-16 VITALS — DIASTOLIC BLOOD PRESSURE: 60 MMHG | SYSTOLIC BLOOD PRESSURE: 114 MMHG

## 2020-12-16 LAB
HCT VFR BLD CALC: 27.1 % (ref 39–53)
HGB BLD-MCNC: 9.1 G/DL (ref 13–17.5)
MCHC RBC AUTO-ENTMCNC: 34 G/DL (ref 31–37)
PROTHROMBIN TIME: 12.4 SEC (ref 11.7–14)

## 2020-12-16 RX ADMIN — SENNOSIDES AND DOCUSATE SODIUM SCH TAB: 8.6; 5 TABLET ORAL at 08:28

## 2020-12-16 RX ADMIN — ACETAMINOPHEN SCH MG: 500 TABLET ORAL at 03:00

## 2020-12-16 RX ADMIN — EZETIMIBE SCH MG: 10 TABLET ORAL at 08:28

## 2020-12-16 RX ADMIN — INSULIN LISPRO SCH UNITS: 100 INJECTION, SOLUTION INTRAVENOUS; SUBCUTANEOUS at 08:00

## 2020-12-16 RX ADMIN — METOPROLOL SUCCINATE SCH MG: 25 TABLET, EXTENDED RELEASE ORAL at 08:29

## 2020-12-16 RX ADMIN — MULTIPLE VITAMINS W/ MINERALS TAB SCH TAB: TAB at 08:29

## 2020-12-16 RX ADMIN — OXYCODONE HYDROCHLORIDE AND ACETAMINOPHEN PRN TAB: 10; 325 TABLET ORAL at 12:20

## 2020-12-16 RX ADMIN — ACETAMINOPHEN SCH MG: 500 TABLET ORAL at 08:28

## 2020-12-16 RX ADMIN — ASPIRIN SCH MG: 325 TABLET, DELAYED RELEASE ORAL at 08:28

## 2020-12-16 RX ADMIN — OXYCODONE HYDROCHLORIDE AND ACETAMINOPHEN PRN TAB: 10; 325 TABLET ORAL at 05:45

## 2020-12-16 RX ADMIN — PANTOPRAZOLE SODIUM SCH MG: 40 TABLET, DELAYED RELEASE ORAL at 05:44

## 2020-12-16 RX ADMIN — CELECOXIB SCH MG: 100 CAPSULE ORAL at 08:29

## 2020-12-16 RX ADMIN — LISINOPRIL SCH MG: 10 TABLET ORAL at 08:30

## 2020-12-16 RX ADMIN — INSULIN LISPRO SCH UNITS: 100 INJECTION, SOLUTION INTRAVENOUS; SUBCUTANEOUS at 11:41

## 2020-12-16 NOTE — PATHOLOGY
[facility] Accession Number: [id number]

.                                                                01

Material submitted:                                        .

knee - RIGHT KNEE BONE AND TISSUE. Modifiers: right

.                                                                01

Clinical history:                                          .

RIGHT KNEE OA

.                                                                02

**********************************************************************

Diagnosis:

Segments of bone and soft tissue, robotic assisted right total knee

arthroplasty:

- Advanced degenerative arthritis.

([facility]:; 12/16/2020)

[facility]  12/16/2020  1652 Local

**********************************************************************

.                                                                02

Electronically signed:                                     .

Hari Garcia MD, Pathologist

NPI- [id number]

.                                                                01

Gross description:                                         .

The specimen is received in formalin, labeled "Adalberto Cesar, right

knee bone and tissue".  Received are multiple segments of bone, including

the tibial plateau, admixed with soft tissue measuring 11.8 x 9.9 x 2.7 cm

in aggregate dimensions.  Meniscus is absent.  The articular surfaces are

smooth to granular in appearance with evidence of eburnation.  The

specimen is submitted representatively in cassette A1, following

decalcification.

([facility]; 12/15/2020)

[facility]  12/15/2020  1248 Local

.                                                                02

Pathologist provided ICD-10:

M17.11

.                                                                02

CPT                                                        .

400144, 950652

Specimen Comment: A courtesy copy of this report has been sent to [phone], [phone]

Specimen Comment: [phone]

Specimen Comment: Report sent to  / DR PEDERSEN

Specimen Comment: A duplicate report has been generated due to demographic updates.

***Performed at:  01

   [facility]

   [address], [place]  [id number]

   MD Julian Bryant MD Phone:  [phone]

***Performed at:  02

   [facility]

   [address], [place]  [id number]

   MD Hari Garcia MD Phone:  [phone]

## 2020-12-16 NOTE — PDOC3
Discharge Summary


Visit Information


Date of Admission:  Dec 14, 2020


Date of Discharge:  Dec 16, 2020


Final Diagnosis


Osteoarthritis right knee.  Aftercare after right total knee arthroplasty.





Brief Hospital Course


Allergies





                                    Allergies








Coded Allergies Type Severity Reaction Last Updated Verified


 


  risankizumab-rzaa Allergy Severe RASH 12/14/20 Yes


 


  atorvastatin Allergy Intermediate  12/14/20 Yes


 


  venlafaxine Allergy Intermediate Unknown 12/14/20 Yes


 


  I S O L A T I O N *CONTACT* Allergy Unknown  12/14/20 Yes


 


  bupropion Adverse Reaction Intermediate  12/14/20 Yes


 


  sertraline Adverse Reaction Intermediate Unknown 12/14/20 Yes








Vital Signs





Vital Signs








  Date Time  Temp Pulse Resp B/P (MAP) Pulse Ox O2 Delivery O2 Flow Rate FiO2


 


12/16/20 12:20     97 Room Air  


 


12/16/20 08:30  65  114/60    


 


12/16/20 06:30 98.0  22     





 98.0       


 


12/14/20 16:45       2.0 








Lab Results





Laboratory Tests








Test


 12/14/20


15:07 12/15/20


07:02 12/15/20


07:20 12/15/20


16:35


 


Glucose (Fingerstick)


 156 mg/dL


(70-99) 119 mg/dL


(70-99) 


 151 mg/dL


(70-99)


 


Prothrombin Time


 


 


 13.7 SEC


(11.7-14.0) 





 


Prothromb Time International


Ratio 


 


 1.1 (0.8-1.1) 


 





 


Test


 12/16/20


05:46 12/16/20


08:30 


 





 


Glucose (Fingerstick)


 130 mg/dL


(70-99) 


 


 





 


Hemoglobin


 


 9.1 g/dL


(13.0-17.5) 


 





 


Hematocrit


 


 27.1 %


(39.0-53.0) 


 





 


Mean Corpuscular Hemoglobin


Concent 


 34 g/dL


(31-37) 


 





 


Prothrombin Time


 


 12.4 SEC


(11.7-14.0) 


 





 


Prothromb Time International


Ratio 


 1.0 (0.8-1.1) 


 


 











Laboratory Tests








Test


 12/15/20


16:35 12/16/20


05:46 12/16/20


08:30


 


Glucose (Fingerstick)


 151 mg/dL


(70-99) 130 mg/dL


(70-99) 





 


Hemoglobin


 


 


 9.1 g/dL


(13.0-17.5)


 


Hematocrit


 


 


 27.1 %


(39.0-53.0)


 


Mean Corpuscular Hemoglobin


Concent 


 


 34 g/dL


(31-37)


 


Prothrombin Time


 


 


 12.4 SEC


(11.7-14.0)


 


Prothromb Time International


Ratio 


 


 1.0 (0.8-1.1) 











Brief Hospital Course


65 year old who presented with knee osteoarthritis, for elective total knee 

arthroplasty.  The patient underwent total knee arthroplasty under general 

anesthesia the day of admission.  Perioperative antibiotics and DVT prophylaxis 

were used.  Postoperatively physical therapy and case management were consulted.

 The patient progressed and is stable for discharge.





Discharge Information


Condition at Discharge:  Stable


Follow Up:  Weeks


Disposition/Orders:  D/C to Home w/ HH


Scheduled


Amlodipine Besylate (Amlodipine Besylate), 5 MG PO DAILY, (Reported)


Aspirin (Aspirin), 1 TAB PO DAILY, (Reported)


Atorvastatin Calcium (Atorvastatin Calcium), 1 TAB PO DAILY, (Reported)


Celecoxib (Celebrex), 1 CAP PO DAILY, (Reported)


Ezetimibe (Zetia), 1 TAB PO DAILY, (Reported)


Lisinopril (Lisinopril), 1 TAB PO DAILY, (Reported)


Metformin HCl (Metformin HCl), 500 MG PO BIDAC, (Reported)


Metoprolol Succinate (Metoprolol Succinate ( Xl )), 1 TAB PO DAILY, (Reported)


Omeprazole (Omeprazole), 1 TAB PO DAILY, (Reported)





Patient Instructions


Patient Instructions


Continue to weight bearing as tolerated with walker. Keep MILAGROS dressing  intact 

and dry.  Follow up with Dr. Breen's office next week. Call for appointment [phone] unless already scheduled. Continue enteric coated aspirin 325 mg by m

out twice a day for 30 days to prevent blood clots.





Justicifation of Admission Dx:


Justifications for Admission:


Justification of Admission Dx:  Yes











MIRA BREEN MD                 Dec 16, 2020 13:43

## 2020-12-16 NOTE — NUR
Patient left around 1520 with his wife. MILAGROS dressing changed prior to dismissal. Discharge 
education completed by this nurse, therapy, and the doctor prior to discharge. Script sent 
straight to pharmacy per Dr Breen and they will  his 325mg aspirin when they  
his pain medication. IV discontinued early today. No complaints or concerns noted at 
discharge.

## 2020-12-16 NOTE — SNU/HH DC
DISCHARGE WITH HOME HEALTH


DISCHARGE INFORMATION:


Discharge Date:  Dec 16, 2020


Final Diagnosis:


Osteoarthritis right knee.  Aftercare after right total knee arthroplasty.


Condition on Discharge:  Stable





CODE STATUS:


Code Status:  Full





HOME HEALTH:


Face to Face:


I certify this patient is under my care and that I, or a nurse practitioner or 

physician's assistant working with me, had a face to face encounter that meets 

the physician face to face encounter requirements with this patient on 

12/16/2020


Medical Complications:  S/P Joint Replacement


RN For Eval/Treatment:  No


Physical Therapy For:  Evalulation/Treatment


Occupational Therapy For:  Evaluation/Treatment


Pt Meets Homebound Status:  Unsteady balance w/ amb,, Limited distance walking 

(Plays)





POST DISCHARGE ORDERS:


Activity Instructions for Disc:  Walk in house


Weight Bearing Status after Di:  No restrictions, Full weight bearing, As 

tolerated


Bathing Instructions:  Shower-keep dressing dry, No Tub Bath until see 


Wound/Incision Care:  Ice to area for comfort, Keep wound/cast CDI, Keep wound 

elevated, Do not change dressing


Other wound/incision instructi:  remove Dre battery pack on monday 12/21 

unscrew tubing and tape down





CHECKS AFTER DISCHARGE:


Checks after discharge:  Check blood press - daily, Check blood sugar, ac/hs





FOLLOW-UP:


Follow Up With:  F/u Dr. Breen on 12/21 at 0915 if need to reschedule call 

[phone]


Warfarin Follow UP:  on aspirin 325mg (2) two times a day





TREATMENT/EQUIPMENT ORDERS:


Adaptive Equipment Issued:  None





CERTIFICATION STATEMENT:


Certification Statement:


Certification Statement: Based on the above finding, I certify that this patient

is confined to the home and needs intermittent skilled nursing care, physical 

therapy and/or speech therapy, or continues to need occupational therapy.~ This 

patient is under my care, and I have initiated the establishment of the plan of 

care.~ This patient will be followed by myself or a community physician who will

periodically review the plan of care.


Home Meds


Reported Medications


Omeprazole (OMEPRAZOLE) 20 Mg Tablet., 1 TAB PO DAILY for GERD, #90 TAB 1 

Refill


   11/23/20


Aspirin (ASPIRIN) 81 Mg Tab.chew, 1 TAB PO DAILY for PREVENTATIVE, #30 TAB 3 

Refills


   11/23/20


Lisinopril (LISINOPRIL) 10 Mg Tablet, 1 TAB PO DAILY for HTN, #30 TAB 5 Refills


   11/23/20


Amlodipine Besylate (AMLODIPINE BESYLATE) 5 Mg Tablet, 5 MG PO DAILY for HTN, 

TAB


   11/23/20


Metoprolol Succinate (METOPROLOL SUCCINATE ( XL )) 25 Mg Tab.er.24h, 1 TAB PO 

DAILY for HIGH BP, #30 TAB 5 Refills


   11/23/20


Atorvastatin Calcium (ATORVASTATIN CALCIUM) 40 Mg Tablet, 1 TAB PO DAILY for 

CHOLESTEROL, #30 TAB 5 Refills


   3/3/20


Metformin HCl (Metformin HCl) 500 Mg/5 Ml Solution, 500 MG PO BIDAC for DM, MISC


   3/3/20


Ezetimibe (ZETIA) 10 Mg Tablet, 1 TAB PO DAILY for cholesterol for 30 Days, #30 

TAB 0 Refills


   2/25/20


Celecoxib (CELEBREX) 200 Mg Capsule, 1 CAP PO DAILY, #30 CAP 2 Refills


   12/15/16











MIRA BREEN MD                 Dec 16, 2020 13:44

## 2020-12-16 NOTE — PDOC
PROGRESS NOTES


Date of Service


DATE: 12/16/20 


TIME: 13:40





Subjective


Subjective


Doing well. Planning for discharge today.





Objective


Vital Signs





Vital Signs








  Date Time  Temp Pulse Resp B/P (MAP) Pulse Ox O2 Delivery O2 Flow Rate FiO2


 


12/16/20 12:20     97 Room Air  


 


12/16/20 08:30  64  114/60    


 


12/16/20 06:30 98.0  22     





 98.0       


 


12/14/20 16:45       2.0 








Physical Exam


MILAGROS dressing changed. Incision benign.


Labs





Laboratory Tests








Test


 12/14/20


15:07 12/15/20


07:02 12/15/20


07:20 12/15/20


16:35


 


Glucose (Fingerstick)


 156 mg/dL


(70-99) 119 mg/dL


(70-99) 


 151 mg/dL


(70-99)


 


Prothrombin Time


 


 


 13.7 SEC


(11.7-14.0) 





 


Prothromb Time International


Ratio 


 


 1.1 (0.8-1.1) 


 





 


Test


 12/16/20


05:46 12/16/20


08:30 


 





 


Glucose (Fingerstick)


 130 mg/dL


(70-99) 


 


 





 


Hemoglobin


 


 9.1 g/dL


(13.0-17.5) 


 





 


Hematocrit


 


 27.1 %


(39.0-53.0) 


 





 


Mean Corpuscular Hemoglobin


Concent 


 34 g/dL


(31-37) 


 





 


Prothrombin Time


 


 12.4 SEC


(11.7-14.0) 


 





 


Prothromb Time International


Ratio 


 1.0 (0.8-1.1) 


 


 











Laboratory Tests








Test


 12/15/20


16:35 12/16/20


05:46 12/16/20


08:30


 


Glucose (Fingerstick)


 151 mg/dL


(70-99) 130 mg/dL


(70-99) 





 


Hemoglobin


 


 


 9.1 g/dL


(13.0-17.5)


 


Hematocrit


 


 


 27.1 %


(39.0-53.0)


 


Mean Corpuscular Hemoglobin


Concent 


 


 34 g/dL


(31-37)


 


Prothrombin Time


 


 


 12.4 SEC


(11.7-14.0)


 


Prothromb Time International


Ratio 


 


 1.0 (0.8-1.1) 














Assessment


Assessment


POD #2 after TKA





Plan


Plan of Care


Home today. Oral pain meds. Aspirin BID for DVT prophylaxis. Follow up with my 

office next week. Home Health PT.





Justicifation of Admission Dx:


Justifications for Admission:


Justification of Admission Dx:  Yes











MIRA CASTAÑEDA MD                 Dec 16, 2020 13:41

## 2020-12-22 NOTE — NUR
IV Vancomycin started at 2306 on 12/14-Stop time 0006 on 12/15

IV Cefazolin started at 0555 on 12/15--Stop time 0635 on 12/15